# Patient Record
Sex: FEMALE | Race: OTHER | HISPANIC OR LATINO
[De-identification: names, ages, dates, MRNs, and addresses within clinical notes are randomized per-mention and may not be internally consistent; named-entity substitution may affect disease eponyms.]

---

## 2022-10-24 PROBLEM — Z00.00 ENCOUNTER FOR PREVENTIVE HEALTH EXAMINATION: Status: ACTIVE | Noted: 2022-10-24

## 2022-10-27 ENCOUNTER — APPOINTMENT (OUTPATIENT)
Dept: OBGYN | Facility: CLINIC | Age: 37
End: 2022-10-27

## 2022-11-02 ENCOUNTER — APPOINTMENT (OUTPATIENT)
Dept: OBGYN | Facility: CLINIC | Age: 37
End: 2022-11-02

## 2022-11-02 VITALS
DIASTOLIC BLOOD PRESSURE: 82 MMHG | BODY MASS INDEX: 31.76 KG/M2 | HEART RATE: 62 BPM | HEIGHT: 64 IN | SYSTOLIC BLOOD PRESSURE: 125 MMHG | WEIGHT: 186 LBS

## 2022-11-02 DIAGNOSIS — Z01.419 ENCOUNTER FOR GYNECOLOGICAL EXAMINATION (GENERAL) (ROUTINE) W/OUT ABNORMAL FINDINGS: ICD-10-CM

## 2022-11-02 PROCEDURE — 99385 PREV VISIT NEW AGE 18-39: CPT

## 2022-11-03 LAB
C TRACH RRNA SPEC QL NAA+PROBE: NOT DETECTED
HBV SURFACE AG SER QL: NONREACTIVE
HCV AB SER QL: NONREACTIVE
HCV S/CO RATIO: 0.13 S/CO
HIV1+2 AB SPEC QL IA.RAPID: NONREACTIVE
HPV HIGH+LOW RISK DNA PNL CVX: NOT DETECTED
N GONORRHOEA RRNA SPEC QL NAA+PROBE: NOT DETECTED
SOURCE AMPLIFICATION: NORMAL
T PALLIDUM AB SER QL IA: NEGATIVE

## 2022-11-07 LAB — CYTOLOGY CVX/VAG DOC THIN PREP: NORMAL

## 2022-11-16 ENCOUNTER — APPOINTMENT (OUTPATIENT)
Dept: OBGYN | Facility: CLINIC | Age: 37
End: 2022-11-16

## 2022-11-16 VITALS
DIASTOLIC BLOOD PRESSURE: 87 MMHG | BODY MASS INDEX: 31.58 KG/M2 | SYSTOLIC BLOOD PRESSURE: 119 MMHG | WEIGHT: 185 LBS | HEIGHT: 64 IN | HEART RATE: 69 BPM

## 2022-11-16 DIAGNOSIS — Z31.5 ENCOUNTER FOR PROCREATIVE GENETIC COUNSELING: ICD-10-CM

## 2022-11-16 PROCEDURE — 99213 OFFICE O/P EST LOW 20 MIN: CPT

## 2023-01-13 ENCOUNTER — APPOINTMENT (OUTPATIENT)
Dept: OBGYN | Facility: CLINIC | Age: 38
End: 2023-01-13

## 2023-07-05 ENCOUNTER — APPOINTMENT (OUTPATIENT)
Dept: OBGYN | Facility: CLINIC | Age: 38
End: 2023-07-05
Payer: COMMERCIAL

## 2023-07-05 ENCOUNTER — ASOB RESULT (OUTPATIENT)
Age: 38
End: 2023-07-05

## 2023-07-05 VITALS
HEIGHT: 64 IN | DIASTOLIC BLOOD PRESSURE: 73 MMHG | SYSTOLIC BLOOD PRESSURE: 108 MMHG | BODY MASS INDEX: 34.15 KG/M2 | WEIGHT: 200 LBS | HEART RATE: 65 BPM

## 2023-07-05 DIAGNOSIS — N92.6 IRREGULAR MENSTRUATION, UNSPECIFIED: ICD-10-CM

## 2023-07-05 DIAGNOSIS — Z78.9 OTHER SPECIFIED HEALTH STATUS: ICD-10-CM

## 2023-07-05 PROCEDURE — 99214 OFFICE O/P EST MOD 30 MIN: CPT

## 2023-07-05 PROCEDURE — 76817 TRANSVAGINAL US OBSTETRIC: CPT

## 2023-07-05 NOTE — PHYSICAL EXAM
[Appropriately responsive] : appropriately responsive [Chaperone Present] : A chaperone was present in the examining room during all aspects of the physical examination [Alert] : alert [No Acute Distress] : no acute distress [Regular Rate Rhythm] : regular rate rhythm [Clear to Auscultation B/L] : clear to auscultation bilaterally [Soft] : soft [Non-tender] : non-tender [Non-distended] : non-distended [Oriented x3] : oriented x3 [Labia Majora] : normal [Labia Minora] : normal [Normal] : normal

## 2023-07-05 NOTE — PLAN
[FreeTextEntry1] : 38 y/o  LMP 23 with pregnancy of unknown viability\par \par Plan:\par - TVUS confirmed, IUP but cannot confirm viability\par - pap up to date\par - urine culture, GC/CT, affirm collected\par - RTO in 2 weeks for repeat sono and f/u with me\par - f/u carrier screening results Continue to Observe

## 2023-07-05 NOTE — HISTORY OF PRESENT ILLNESS
[FreeTextEntry1] : 38 y/o  LMP 23 presents with missed menses and positive pregnancy test. TVUS shows intrauterine gestational sac with faint yolk sac but no fetal pole. Denies cramping, bleeding, does report vaginal odor. This is a desired pregnancy.

## 2023-07-06 ENCOUNTER — NON-APPOINTMENT (OUTPATIENT)
Age: 38
End: 2023-07-06

## 2023-07-06 LAB
C TRACH RRNA SPEC QL NAA+PROBE: NOT DETECTED
CANDIDA VAG CYTO: NOT DETECTED
G VAGINALIS+PREV SP MTYP VAG QL MICRO: NOT DETECTED
N GONORRHOEA RRNA SPEC QL NAA+PROBE: NOT DETECTED
SOURCE AMPLIFICATION: NORMAL
T VAGINALIS VAG QL WET PREP: NOT DETECTED

## 2023-07-07 LAB — BACTERIA UR CULT: NORMAL

## 2023-07-17 ENCOUNTER — APPOINTMENT (OUTPATIENT)
Dept: OBGYN | Facility: CLINIC | Age: 38
End: 2023-07-17
Payer: COMMERCIAL

## 2023-07-17 ENCOUNTER — ASOB RESULT (OUTPATIENT)
Age: 38
End: 2023-07-17

## 2023-07-17 VITALS
HEIGHT: 64 IN | DIASTOLIC BLOOD PRESSURE: 78 MMHG | HEART RATE: 73 BPM | WEIGHT: 200 LBS | SYSTOLIC BLOOD PRESSURE: 117 MMHG | BODY MASS INDEX: 34.15 KG/M2

## 2023-07-17 DIAGNOSIS — Z71.1 PERSON WITH FEARED HEALTH COMPLAINT IN WHOM NO DIAGNOSIS IS MADE: ICD-10-CM

## 2023-07-17 PROCEDURE — 76817 TRANSVAGINAL US OBSTETRIC: CPT

## 2023-07-17 PROCEDURE — 99213 OFFICE O/P EST LOW 20 MIN: CPT

## 2023-07-18 ENCOUNTER — NON-APPOINTMENT (OUTPATIENT)
Age: 38
End: 2023-07-18

## 2023-07-18 LAB
ABO + RH PNL BLD: NORMAL
BLD GP AB SCN SERPL QL: NORMAL

## 2023-07-19 ENCOUNTER — NON-APPOINTMENT (OUTPATIENT)
Age: 38
End: 2023-07-19

## 2023-07-27 ENCOUNTER — NON-APPOINTMENT (OUTPATIENT)
Age: 38
End: 2023-07-27

## 2023-07-27 ENCOUNTER — EMERGENCY (EMERGENCY)
Facility: HOSPITAL | Age: 38
LOS: 1 days | Discharge: HOME | End: 2023-07-27
Attending: EMERGENCY MEDICINE
Payer: COMMERCIAL

## 2023-07-27 VITALS
SYSTOLIC BLOOD PRESSURE: 110 MMHG | WEIGHT: 199.96 LBS | DIASTOLIC BLOOD PRESSURE: 60 MMHG | RESPIRATION RATE: 20 BRPM | HEART RATE: 67 BPM | TEMPERATURE: 98 F | OXYGEN SATURATION: 99 % | HEIGHT: 64 IN

## 2023-07-27 PROCEDURE — 99284 EMERGENCY DEPT VISIT MOD MDM: CPT

## 2023-07-27 NOTE — ED ADULT TRIAGE NOTE - CHIEF COMPLAINT QUOTE
Approximately 9 weeks pregnant, begun vaginally bleeding yesterday, endorsing dizziness/lightheadedness.

## 2023-07-27 NOTE — ED ADULT TRIAGE NOTE - SPO2 (%)
Quality 110: Preventive Care And Screening: Influenza Immunization: Influenza Immunization Ordered or Recommended, but not Administered due to system reason
Quality 431: Preventive Care And Screening: Unhealthy Alcohol Use - Screening: Patient not identified as an unhealthy alcohol user when screened for unhealthy alcohol use using a systematic screening method
Detail Level: Detailed
Quality 226: Preventive Care And Screening: Tobacco Use: Screening And Cessation Intervention: Patient screened for tobacco use and is an ex/non-smoker
99

## 2023-07-28 VITALS
RESPIRATION RATE: 16 BRPM | TEMPERATURE: 98 F | HEART RATE: 73 BPM | SYSTOLIC BLOOD PRESSURE: 103 MMHG | DIASTOLIC BLOOD PRESSURE: 67 MMHG | OXYGEN SATURATION: 98 %

## 2023-07-28 LAB
ALBUMIN SERPL ELPH-MCNC: 4.4 G/DL — SIGNIFICANT CHANGE UP (ref 3.3–5)
ALP SERPL-CCNC: 64 U/L — SIGNIFICANT CHANGE UP (ref 40–120)
ALT FLD-CCNC: 35 U/L — SIGNIFICANT CHANGE UP (ref 10–45)
ANION GAP SERPL CALC-SCNC: 12 MMOL/L — SIGNIFICANT CHANGE UP (ref 5–17)
APTT BLD: 36.4 SEC — HIGH (ref 24.5–35.6)
AST SERPL-CCNC: 28 U/L — SIGNIFICANT CHANGE UP (ref 10–40)
BASOPHILS # BLD AUTO: 0.03 K/UL — SIGNIFICANT CHANGE UP (ref 0–0.2)
BASOPHILS NFR BLD AUTO: 0.5 % — SIGNIFICANT CHANGE UP (ref 0–2)
BILIRUB SERPL-MCNC: 0.3 MG/DL — SIGNIFICANT CHANGE UP (ref 0.2–1.2)
BLD GP AB SCN SERPL QL: NEGATIVE — SIGNIFICANT CHANGE UP
BUN SERPL-MCNC: 14 MG/DL — SIGNIFICANT CHANGE UP (ref 7–23)
CALCIUM SERPL-MCNC: 9.4 MG/DL — SIGNIFICANT CHANGE UP (ref 8.4–10.5)
CHLORIDE SERPL-SCNC: 104 MMOL/L — SIGNIFICANT CHANGE UP (ref 96–108)
CO2 SERPL-SCNC: 22 MMOL/L — SIGNIFICANT CHANGE UP (ref 22–31)
CREAT SERPL-MCNC: 0.62 MG/DL — SIGNIFICANT CHANGE UP (ref 0.5–1.3)
EGFR: 117 ML/MIN/1.73M2 — SIGNIFICANT CHANGE UP
EOSINOPHIL # BLD AUTO: 0.26 K/UL — SIGNIFICANT CHANGE UP (ref 0–0.5)
EOSINOPHIL NFR BLD AUTO: 4.7 % — SIGNIFICANT CHANGE UP (ref 0–6)
GLUCOSE SERPL-MCNC: 93 MG/DL — SIGNIFICANT CHANGE UP (ref 70–99)
HCG SERPL-ACNC: 7335 MIU/ML — HIGH
HCT VFR BLD CALC: 38 % — SIGNIFICANT CHANGE UP (ref 34.5–45)
HGB BLD-MCNC: 12.6 G/DL — SIGNIFICANT CHANGE UP (ref 11.5–15.5)
IMM GRANULOCYTES NFR BLD AUTO: 0.4 % — SIGNIFICANT CHANGE UP (ref 0–0.9)
INR BLD: 1.07 RATIO — SIGNIFICANT CHANGE UP (ref 0.85–1.18)
LYMPHOCYTES # BLD AUTO: 2.22 K/UL — SIGNIFICANT CHANGE UP (ref 1–3.3)
LYMPHOCYTES # BLD AUTO: 39.9 % — SIGNIFICANT CHANGE UP (ref 13–44)
MCHC RBC-ENTMCNC: 28.8 PG — SIGNIFICANT CHANGE UP (ref 27–34)
MCHC RBC-ENTMCNC: 33.2 GM/DL — SIGNIFICANT CHANGE UP (ref 32–36)
MCV RBC AUTO: 87 FL — SIGNIFICANT CHANGE UP (ref 80–100)
MONOCYTES # BLD AUTO: 0.51 K/UL — SIGNIFICANT CHANGE UP (ref 0–0.9)
MONOCYTES NFR BLD AUTO: 9.2 % — SIGNIFICANT CHANGE UP (ref 2–14)
NEUTROPHILS # BLD AUTO: 2.52 K/UL — SIGNIFICANT CHANGE UP (ref 1.8–7.4)
NEUTROPHILS NFR BLD AUTO: 45.3 % — SIGNIFICANT CHANGE UP (ref 43–77)
NRBC # BLD: 0 /100 WBCS — SIGNIFICANT CHANGE UP (ref 0–0)
PLATELET # BLD AUTO: 282 K/UL — SIGNIFICANT CHANGE UP (ref 150–400)
POTASSIUM SERPL-MCNC: 3.8 MMOL/L — SIGNIFICANT CHANGE UP (ref 3.5–5.3)
POTASSIUM SERPL-SCNC: 3.8 MMOL/L — SIGNIFICANT CHANGE UP (ref 3.5–5.3)
PROT SERPL-MCNC: 7 G/DL — SIGNIFICANT CHANGE UP (ref 6–8.3)
PROTHROM AB SERPL-ACNC: 11.8 SEC — SIGNIFICANT CHANGE UP (ref 9.5–13)
RBC # BLD: 4.37 M/UL — SIGNIFICANT CHANGE UP (ref 3.8–5.2)
RBC # FLD: 11.5 % — SIGNIFICANT CHANGE UP (ref 10.3–14.5)
RH IG SCN BLD-IMP: POSITIVE — SIGNIFICANT CHANGE UP
SODIUM SERPL-SCNC: 138 MMOL/L — SIGNIFICANT CHANGE UP (ref 135–145)
WBC # BLD: 5.56 K/UL — SIGNIFICANT CHANGE UP (ref 3.8–10.5)
WBC # FLD AUTO: 5.56 K/UL — SIGNIFICANT CHANGE UP (ref 3.8–10.5)

## 2023-07-28 PROCEDURE — 84702 CHORIONIC GONADOTROPIN TEST: CPT

## 2023-07-28 PROCEDURE — 85025 COMPLETE CBC W/AUTO DIFF WBC: CPT

## 2023-07-28 PROCEDURE — 85610 PROTHROMBIN TIME: CPT

## 2023-07-28 PROCEDURE — 99284 EMERGENCY DEPT VISIT MOD MDM: CPT | Mod: 25

## 2023-07-28 PROCEDURE — 86850 RBC ANTIBODY SCREEN: CPT

## 2023-07-28 PROCEDURE — 86900 BLOOD TYPING SEROLOGIC ABO: CPT

## 2023-07-28 PROCEDURE — 80053 COMPREHEN METABOLIC PANEL: CPT

## 2023-07-28 PROCEDURE — 85730 THROMBOPLASTIN TIME PARTIAL: CPT

## 2023-07-28 PROCEDURE — 86901 BLOOD TYPING SEROLOGIC RH(D): CPT

## 2023-07-28 PROCEDURE — 76817 TRANSVAGINAL US OBSTETRIC: CPT | Mod: 26

## 2023-07-28 PROCEDURE — 76817 TRANSVAGINAL US OBSTETRIC: CPT

## 2023-07-28 RX ORDER — SODIUM CHLORIDE 9 MG/ML
1000 INJECTION INTRAMUSCULAR; INTRAVENOUS; SUBCUTANEOUS ONCE
Refills: 0 | Status: COMPLETED | OUTPATIENT
Start: 2023-07-28 | End: 2023-07-28

## 2023-07-28 RX ADMIN — SODIUM CHLORIDE 1000 MILLILITER(S): 9 INJECTION INTRAMUSCULAR; INTRAVENOUS; SUBCUTANEOUS at 00:18

## 2023-07-28 NOTE — ED PROVIDER NOTE - NSFOLLOWUPCLINICS_GEN_ALL_ED_FT
[facility]  Gynceology/OB  [address] [place] [address]  Phone: [phone]  Fax:      [facility]  Gynceology/OB  [address] [place] [address]  Phone: [phone]  Fax:      [facility]  Gynceology/OB  [address] [place] [address]  Phone: [phone]  Fax:

## 2023-07-28 NOTE — ED PROVIDER NOTE - OBJECTIVE STATEMENT
37-year-old female G3, P2 chief complaint vaginal bleeding.  Patient is 9 weeks pregnant.  Patient states that she went to the OB/GYN 2 weeks ago was not able to find the heart rate.  Patient states today and yesterday she started to experiencing vaginal bleeding 1 pad every 2 hours endorsing slight dizziness.

## 2023-07-28 NOTE — ED PROVIDER NOTE - PHYSICAL EXAMINATION
GENERAL: Awake, alert, NAD  LUNGS: CTAB, no wheezes or crackles   CARDIAC: RRR, no m/r/g  ABDOMEN: Soft, non tender, non distended, no rebound, no guarding  BACK: No midline spinal tenderness, no CVA tenderness  EXT: No edema, no calf tenderness, 2+ DP pulses bilaterally, no deformities.  NEURO: A&Ox3. Moving all extremities.  SKIN: Warm and dry. No rash.  PSYCH: Normal affect.

## 2023-07-28 NOTE — ED PROVIDER NOTE - PATIENT PORTAL LINK FT
You can access the FollowMyHealth Patient Portal offered by [facility] by registering at the following website: [email or web address] By joining [employer]’s FollowMyHealth portal, you will also be able to view your health information using other applications (apps) compatible with our system. You can access the FollowMyHealth Patient Portal offered by [facility] by registering at the following website: [email or web address] By joining [employer]’s FollowMyHealth portal, you will also be able to view your health information using other applications (apps) compatible with our system. You can access the FollowMyHealth Patient Portal offered by [facility] by registering at the following website: [email or web address]. By joining [email or web address]’s FollowMyHealth portal, you will also be able to view your health information using other applications (apps) compatible with our system.

## 2023-07-28 NOTE — ED ADULT NURSE NOTE - NSFALLUNIVINTERV_ED_ALL_ED
Bed/Stretcher in lowest position, wheels locked, appropriate side rails in place/Call bell, personal items and telephone in reach/Instruct patient to call for assistance before getting out of bed/chair/stretcher/Non-slip footwear applied when patient is off stretcher/[place] to call system/Physically safe environment - no spills, clutter or unnecessary equipment/Purposeful proactive rounding/Room/bathroom lighting operational, light cord in reach Bed/Stretcher in lowest position, wheels locked, appropriate side rails in place/Call bell, personal items and telephone in reach/Instruct patient to call for assistance before getting out of bed/chair/stretcher/Non-slip footwear applied when patient is off stretcher/[place] to call system/Physically safe environment - no spills, clutter or unnecessary equipment/Purposeful proactive rounding/Room/bathroom lighting operational, light cord in reach Bed/Stretcher in lowest position, wheels locked, appropriate side rails in place/Call bell, personal items and telephone in reach/Instruct patient to call for assistance before getting out of bed/chair/stretcher/Non-slip footwear applied when patient is off stretcher/[place] to call system/Physically safe environment - no spills, clutter or unnecessary equipment/Purposeful proactive rounding/Room/bathroom lighting operational, light cord in reach

## 2023-07-28 NOTE — ED PROVIDER NOTE - ATTENDING CONTRIBUTION TO CARE
36 yo female @~9 weeks with intermittent vaginal bleeding x 1 week p/w continued intermittent bleeding, otherwise no pelvic pain, conversant, no distress.  u/s here with sac, no HR detected, lower than expected HCG c/w fetal demise.  d/w patient, OK for d/c tonight, call OB in AM for further management.

## 2023-07-28 NOTE — ED ADULT NURSE NOTE - OBJECTIVE STATEMENT
36 YO female with no PMH    via walk in presenting with complaints of  vaginal bleeding. pt repots she is 9 weeks pregnant  went to OV appointment last week and was noted to have no fetal HR. pt reports vaginal bleeding starting to day soaking 2 pad. pt reports slight dizziness. denies CP, SOb, abdominal pain.   Pt Axox4, gross neuro intact, PERRL mm. respirations even, & non-labored Abdomen soft, non-tender, non-distended. Skin warm, dry, and intact. Pt placed in position of comfort. Pt educated on call bell system and provided call bell. Bed in lowest position, wheels locked, appropriate side rails raised. Pt denies needs at this time.

## 2023-07-28 NOTE — ED PROVIDER NOTE - NSFOLLOWUPINSTRUCTIONS_ED_ALL_ED_FT
Miscarriage  A miscarriage is the loss of pregnancy before the 20th week. Most miscarriages happen during the first 3 months of pregnancy. Sometimes, a miscarriage can happen before a woman knows that she is pregnant.    Having a miscarriage can be an emotional experience. If you have had a miscarriage, talk with your health care provider about any questions you may have about the loss of your baby, the grieving process, and your plans for future pregnancy.    What are the causes?  Many times, the cause of a miscarriage is not known.    What increases the risk?  The following factors may make a pregnant woman more likely to have a miscarriage:    Certain medical conditions    Conditions that affect the hormone balance in the body, such as thyroid disease or polycystic ovary syndrome.  Diabetes.  Autoimmune disorders.  Infections.  Bleeding disorders.  Obesity.  Lifestyle factors    Using products with tobacco or nicotine in them or being exposed to tobacco smoke.  Having alcohol.  Having large amounts of caffeine.  Recreational drug use.  Problems with reproductive organs or structures    Cervical insufficiency. This is when the lowest part of the uterus (cervix) opens and thins before pregnancy is at term.  Having a condition called Asherman syndrome. This syndrome causes scarring in the uterus or causes the uterus to be abnormal in structure.  Fibrous growths, called fibroids, in the uterus.  Congenital abnormalities. These problems are present at birth.  Infection of the cervix or uterus.  Personal or medical history    Injury (trauma).  Having had a miscarriage before.  Being younger than age 18 or older than age 35.  Exposure to harmful substances in the environment. This may include radiation or heavy metals, such as lead.  Use of certain medicines.  What are the signs or symptoms?  Symptoms of this condition include:  Vaginal bleeding or spotting, with or without cramps or pain.  Pain or cramping in the abdomen or lower back.  Fluid or tissue coming out of the vagina.  How is this diagnosed?  This condition may be diagnosed based on:  A physical exam.  Ultrasound.  Lab tests, such as blood tests, urine tests, or swabs for infection.  How is this treated?  Treatment for a miscarriage is sometimes not needed if all the pregnancy tissue that was in the uterus comes out on its own, and there are no other problems such as infection or heavy bleeding.    In other cases, this condition may be treated with:  Dilation and curettage (D&C). In this procedure, the cervix is stretched open and any remaining pregnancy tissue is removed from the lining of the uterus (endometrium).  Medicines. These may include:  Antibiotic medicine, to treat infection.  Medicine to help any remaining pregnancy tissue come out of the body.  Medicine to reduce (contract) the size of the uterus. These medicines may be given if there is a lot of bleeding.  If you have Rh-negative blood, you may be given an injection of a medicine called Rho(D) immune globulin. This medicine helps prevent problems with future pregnancies.    Follow these instructions at home:  Medicines    Take over-the-counter and prescription medicines only as told by your health care provider.  If you were prescribed antibiotic medicine, take it as told by your health care provider. Do not stop taking the antibiotic even if you start to feel better.  Activity    Rest as told by your health care provider. Ask your health care provider what activities are safe for you.  Have someone help with home and family responsibilities during this time.  General instructions      Monitor how much tissue or blood clot material comes out of the vagina.  Do not have sex, douche, or put anything, such as tampons, in your vagina until your health care provider says it is okay.  To help you and your partner with the grieving process, talk with your health care provider or get counseling.  When you are ready, meet with your health care provider to discuss any important steps you should take for your health. Also, discuss steps you should take to have a healthy pregnancy in the future.  Keep all follow-up visits. This is important.  Where to find more information  The American College of Obstetricians and Gynecologists: acog.org  [place] Department of Health and Human Services Office of Women's Health: hrsa.gov/office-womens-health  Contact a health care provider if:  You have a fever or chills.  There is bad-smelling fluid coming from the vagina.  You have more bleeding instead of less.  Tissue or blood clots come out of your vagina.  Get help right away if:  You have severe cramps or pain in your back or abdomen.  Heavy bleeding soaks through 2 large sanitary pads an hour for more than 2 hours.  You become light-headed or weak.  You faint.  You feel sad, and your sadness takes over your thoughts.  You think about hurting yourself.  If you ever feel like you may hurt yourself or others, or have thoughts about taking your own life, get help right away. Go to your nearest emergency department or:  Call your local emergency services (911 in the [place]  Call a suicide crisis helpline, such as the National Suicide Prevention Lifeline at 1-941.419.3145 or 740 in the [place] This is open 24 hours a day in the [place]  Text the Crisis Text Line at 488570 (in the [place]  Summary  Most miscarriages happen in the first 3 months of pregnancy. Sometimes miscarriage happens before a woman knows that she is pregnant.  Follow instructions from your health care provider about medicines and activity.  To help you and your partner with grieving, talk with your health care provider or get counseling.  Keep all follow-up visits.  This information is not intended to replace advice given to you by your health care provider. Make sure you discuss any questions you have with your health care provider. Miscarriage  A miscarriage is the loss of pregnancy before the 20th week. Most miscarriages happen during the first 3 months of pregnancy. Sometimes, a miscarriage can happen before a woman knows that she is pregnant.    Having a miscarriage can be an emotional experience. If you have had a miscarriage, talk with your health care provider about any questions you may have about the loss of your baby, the grieving process, and your plans for future pregnancy.    What are the causes?  Many times, the cause of a miscarriage is not known.    What increases the risk?  The following factors may make a pregnant woman more likely to have a miscarriage:    Certain medical conditions    Conditions that affect the hormone balance in the body, such as thyroid disease or polycystic ovary syndrome.  Diabetes.  Autoimmune disorders.  Infections.  Bleeding disorders.  Obesity.  Lifestyle factors    Using products with tobacco or nicotine in them or being exposed to tobacco smoke.  Having alcohol.  Having large amounts of caffeine.  Recreational drug use.  Problems with reproductive organs or structures    Cervical insufficiency. This is when the lowest part of the uterus (cervix) opens and thins before pregnancy is at term.  Having a condition called Asherman syndrome. This syndrome causes scarring in the uterus or causes the uterus to be abnormal in structure.  Fibrous growths, called fibroids, in the uterus.  Congenital abnormalities. These problems are present at birth.  Infection of the cervix or uterus.  Personal or medical history    Injury (trauma).  Having had a miscarriage before.  Being younger than age 18 or older than age 35.  Exposure to harmful substances in the environment. This may include radiation or heavy metals, such as lead.  Use of certain medicines.  What are the signs or symptoms?  Symptoms of this condition include:  Vaginal bleeding or spotting, with or without cramps or pain.  Pain or cramping in the abdomen or lower back.  Fluid or tissue coming out of the vagina.  How is this diagnosed?  This condition may be diagnosed based on:  A physical exam.  Ultrasound.  Lab tests, such as blood tests, urine tests, or swabs for infection.  How is this treated?  Treatment for a miscarriage is sometimes not needed if all the pregnancy tissue that was in the uterus comes out on its own, and there are no other problems such as infection or heavy bleeding.    In other cases, this condition may be treated with:  Dilation and curettage (D&C). In this procedure, the cervix is stretched open and any remaining pregnancy tissue is removed from the lining of the uterus (endometrium).  Medicines. These may include:  Antibiotic medicine, to treat infection.  Medicine to help any remaining pregnancy tissue come out of the body.  Medicine to reduce (contract) the size of the uterus. These medicines may be given if there is a lot of bleeding.  If you have Rh-negative blood, you may be given an injection of a medicine called Rho(D) immune globulin. This medicine helps prevent problems with future pregnancies.    Follow these instructions at home:  Medicines    Take over-the-counter and prescription medicines only as told by your health care provider.  If you were prescribed antibiotic medicine, take it as told by your health care provider. Do not stop taking the antibiotic even if you start to feel better.  Activity    Rest as told by your health care provider. Ask your health care provider what activities are safe for you.  Have someone help with home and family responsibilities during this time.  General instructions      Monitor how much tissue or blood clot material comes out of the vagina.  Do not have sex, douche, or put anything, such as tampons, in your vagina until your health care provider says it is okay.  To help you and your partner with the grieving process, talk with your health care provider or get counseling.  When you are ready, meet with your health care provider to discuss any important steps you should take for your health. Also, discuss steps you should take to have a healthy pregnancy in the future.  Keep all follow-up visits. This is important.  Where to find more information  The American College of Obstetricians and Gynecologists: acog.org  [place] Department of Health and Human Services Office of Women's Health: hrsa.gov/office-womens-health  Contact a health care provider if:  You have a fever or chills.  There is bad-smelling fluid coming from the vagina.  You have more bleeding instead of less.  Tissue or blood clots come out of your vagina.  Get help right away if:  You have severe cramps or pain in your back or abdomen.  Heavy bleeding soaks through 2 large sanitary pads an hour for more than 2 hours.  You become light-headed or weak.  You faint.  You feel sad, and your sadness takes over your thoughts.  You think about hurting yourself.  If you ever feel like you may hurt yourself or others, or have thoughts about taking your own life, get help right away. Go to your nearest emergency department or:  Call your local emergency services (911 in the [place]  Call a suicide crisis helpline, such as the National Suicide Prevention Lifeline at 1-835.737.5984 or 974 in the [place] This is open 24 hours a day in the [place]  Text the Crisis Text Line at 637357 (in the [place]  Summary  Most miscarriages happen in the first 3 months of pregnancy. Sometimes miscarriage happens before a woman knows that she is pregnant.  Follow instructions from your health care provider about medicines and activity.  To help you and your partner with grieving, talk with your health care provider or get counseling.  Keep all follow-up visits.  This information is not intended to replace advice given to you by your health care provider. Make sure you discuss any questions you have with your health care provider. Miscarriage  A miscarriage is the loss of pregnancy before the 20th week. Most miscarriages happen during the first 3 months of pregnancy. Sometimes, a miscarriage can happen before a woman knows that she is pregnant.    Having a miscarriage can be an emotional experience. If you have had a miscarriage, talk with your health care provider about any questions you may have about the loss of your baby, the grieving process, and your plans for future pregnancy.    What are the causes?  Many times, the cause of a miscarriage is not known.    What increases the risk?  The following factors may make a pregnant woman more likely to have a miscarriage:    Certain medical conditions    Conditions that affect the hormone balance in the body, such as thyroid disease or polycystic ovary syndrome.  Diabetes.  Autoimmune disorders.  Infections.  Bleeding disorders.  Obesity.  Lifestyle factors    Using products with tobacco or nicotine in them or being exposed to tobacco smoke.  Having alcohol.  Having large amounts of caffeine.  Recreational drug use.  Problems with reproductive organs or structures    Cervical insufficiency. This is when the lowest part of the uterus (cervix) opens and thins before pregnancy is at term.  Having a condition called Asherman syndrome. This syndrome causes scarring in the uterus or causes the uterus to be abnormal in structure.  Fibrous growths, called fibroids, in the uterus.  Congenital abnormalities. These problems are present at birth.  Infection of the cervix or uterus.  Personal or medical history    Injury (trauma).  Having had a miscarriage before.  Being younger than age 18 or older than age 35.  Exposure to harmful substances in the environment. This may include radiation or heavy metals, such as lead.  Use of certain medicines.  What are the signs or symptoms?  Symptoms of this condition include:  Vaginal bleeding or spotting, with or without cramps or pain.  Pain or cramping in the abdomen or lower back.  Fluid or tissue coming out of the vagina.  How is this diagnosed?  This condition may be diagnosed based on:  A physical exam.  Ultrasound.  Lab tests, such as blood tests, urine tests, or swabs for infection.  How is this treated?  Treatment for a miscarriage is sometimes not needed if all the pregnancy tissue that was in the uterus comes out on its own, and there are no other problems such as infection or heavy bleeding.    In other cases, this condition may be treated with:  Dilation and curettage (D&C). In this procedure, the cervix is stretched open and any remaining pregnancy tissue is removed from the lining of the uterus (endometrium).  Medicines. These may include:  Antibiotic medicine, to treat infection.  Medicine to help any remaining pregnancy tissue come out of the body.  Medicine to reduce (contract) the size of the uterus. These medicines may be given if there is a lot of bleeding.  If you have Rh-negative blood, you may be given an injection of a medicine called Rho(D) immune globulin. This medicine helps prevent problems with future pregnancies.    Follow these instructions at home:  Medicines    Take over-the-counter and prescription medicines only as told by your health care provider.  If you were prescribed antibiotic medicine, take it as told by your health care provider. Do not stop taking the antibiotic even if you start to feel better.  Activity    Rest as told by your health care provider. Ask your health care provider what activities are safe for you.  Have someone help with home and family responsibilities during this time.  General instructions      Monitor how much tissue or blood clot material comes out of the vagina.  Do not have sex, douche, or put anything, such as tampons, in your vagina until your health care provider says it is okay.  To help you and your partner with the grieving process, talk with your health care provider or get counseling.  When you are ready, meet with your health care provider to discuss any important steps you should take for your health. Also, discuss steps you should take to have a healthy pregnancy in the future.  Keep all follow-up visits. This is important.  Where to find more information  The American College of Obstetricians and Gynecologists: acog.org  [place] Department of Health and Human Services Office of Women's Health: hrsa.gov/office-womens-health  Contact a health care provider if:  You have a fever or chills.  There is bad-smelling fluid coming from the vagina.  You have more bleeding instead of less.  Tissue or blood clots come out of your vagina.  Get help right away if:  You have severe cramps or pain in your back or abdomen.  Heavy bleeding soaks through 2 large sanitary pads an hour for more than 2 hours.  You become light-headed or weak.  You faint.  You feel sad, and your sadness takes over your thoughts.  You think about hurting yourself.  If you ever feel like you may hurt yourself or others, or have thoughts about taking your own life, get help right away. Go to your nearest emergency department or:  Call your local emergency services (911 in the [place]  Call a suicide crisis helpline, such as the National Suicide Prevention Lifeline at 1-377.166.4492 or 028 in the [place] This is open 24 hours a day in the [place]  Text the Crisis Text Line at 272975 (in the [place]  Summary  Most miscarriages happen in the first 3 months of pregnancy. Sometimes miscarriage happens before a woman knows that she is pregnant.  Follow instructions from your health care provider about medicines and activity.  To help you and your partner with grieving, talk with your health care provider or get counseling.  Keep all follow-up visits.  This information is not intended to replace advice given to you by your health care provider. Make sure you discuss any questions you have with your health care provider.

## 2023-07-28 NOTE — ED PROVIDER NOTE - PROGRESS NOTE DETAILS
[facility] PGY 3 Discussed lab and radiology findings with patient. Patient feels comfortable going home. Gave home care and follow up instructions. Discussed which symptoms to look out for and when to return to the ED for further evaluation. Patient given opportunity to ask questions about their medical condition and had all questions answered.  rec fu w/ ob

## 2023-07-31 ENCOUNTER — EMERGENCY (EMERGENCY)
Facility: HOSPITAL | Age: 38
LOS: 1 days | Discharge: HOME | End: 2023-07-31
Attending: EMERGENCY MEDICINE
Payer: COMMERCIAL

## 2023-07-31 ENCOUNTER — APPOINTMENT (OUTPATIENT)
Dept: OBGYN | Facility: CLINIC | Age: 38
End: 2023-07-31

## 2023-07-31 ENCOUNTER — NON-APPOINTMENT (OUTPATIENT)
Age: 38
End: 2023-07-31

## 2023-07-31 VITALS
WEIGHT: 199.96 LBS | DIASTOLIC BLOOD PRESSURE: 69 MMHG | RESPIRATION RATE: 20 BRPM | OXYGEN SATURATION: 100 % | HEART RATE: 71 BPM | SYSTOLIC BLOOD PRESSURE: 133 MMHG | HEIGHT: 64 IN | TEMPERATURE: 99 F

## 2023-07-31 LAB
ALBUMIN SERPL ELPH-MCNC: 4.9 G/DL — SIGNIFICANT CHANGE UP (ref 3.3–5)
ALP SERPL-CCNC: 72 U/L — SIGNIFICANT CHANGE UP (ref 40–120)
ALT FLD-CCNC: 30 U/L — SIGNIFICANT CHANGE UP (ref 10–45)
ANION GAP SERPL CALC-SCNC: 15 MMOL/L — SIGNIFICANT CHANGE UP (ref 5–17)
AST SERPL-CCNC: 21 U/L — SIGNIFICANT CHANGE UP (ref 10–40)
BASOPHILS # BLD AUTO: 0.03 K/UL — SIGNIFICANT CHANGE UP (ref 0–0.2)
BASOPHILS NFR BLD AUTO: 0.4 % — SIGNIFICANT CHANGE UP (ref 0–2)
BILIRUB SERPL-MCNC: 0.6 MG/DL — SIGNIFICANT CHANGE UP (ref 0.2–1.2)
BUN SERPL-MCNC: 9 MG/DL — SIGNIFICANT CHANGE UP (ref 7–23)
CALCIUM SERPL-MCNC: 9.7 MG/DL — SIGNIFICANT CHANGE UP (ref 8.4–10.5)
CHLORIDE SERPL-SCNC: 103 MMOL/L — SIGNIFICANT CHANGE UP (ref 96–108)
CO2 SERPL-SCNC: 22 MMOL/L — SIGNIFICANT CHANGE UP (ref 22–31)
CREAT SERPL-MCNC: 0.58 MG/DL — SIGNIFICANT CHANGE UP (ref 0.5–1.3)
EGFR: 119 ML/MIN/1.73M2 — SIGNIFICANT CHANGE UP
EOSINOPHIL # BLD AUTO: 0.1 K/UL — SIGNIFICANT CHANGE UP (ref 0–0.5)
EOSINOPHIL NFR BLD AUTO: 1.3 % — SIGNIFICANT CHANGE UP (ref 0–6)
GLUCOSE SERPL-MCNC: 86 MG/DL — SIGNIFICANT CHANGE UP (ref 70–99)
HCG SERPL-ACNC: 4824 MIU/ML — HIGH
HCT VFR BLD CALC: 41.7 % — SIGNIFICANT CHANGE UP (ref 34.5–45)
HGB BLD-MCNC: 13.8 G/DL — SIGNIFICANT CHANGE UP (ref 11.5–15.5)
IMM GRANULOCYTES NFR BLD AUTO: 0.3 % — SIGNIFICANT CHANGE UP (ref 0–0.9)
LYMPHOCYTES # BLD AUTO: 1.55 K/UL — SIGNIFICANT CHANGE UP (ref 1–3.3)
LYMPHOCYTES # BLD AUTO: 19.6 % — SIGNIFICANT CHANGE UP (ref 13–44)
MCHC RBC-ENTMCNC: 28.8 PG — SIGNIFICANT CHANGE UP (ref 27–34)
MCHC RBC-ENTMCNC: 33.1 GM/DL — SIGNIFICANT CHANGE UP (ref 32–36)
MCV RBC AUTO: 86.9 FL — SIGNIFICANT CHANGE UP (ref 80–100)
MONOCYTES # BLD AUTO: 0.55 K/UL — SIGNIFICANT CHANGE UP (ref 0–0.9)
MONOCYTES NFR BLD AUTO: 7 % — SIGNIFICANT CHANGE UP (ref 2–14)
NEUTROPHILS # BLD AUTO: 5.66 K/UL — SIGNIFICANT CHANGE UP (ref 1.8–7.4)
NEUTROPHILS NFR BLD AUTO: 71.4 % — SIGNIFICANT CHANGE UP (ref 43–77)
NRBC # BLD: 0 /100 WBCS — SIGNIFICANT CHANGE UP (ref 0–0)
PLATELET # BLD AUTO: 324 K/UL — SIGNIFICANT CHANGE UP (ref 150–400)
POTASSIUM SERPL-MCNC: 3.5 MMOL/L — SIGNIFICANT CHANGE UP (ref 3.5–5.3)
POTASSIUM SERPL-SCNC: 3.5 MMOL/L — SIGNIFICANT CHANGE UP (ref 3.5–5.3)
PROT SERPL-MCNC: 7.8 G/DL — SIGNIFICANT CHANGE UP (ref 6–8.3)
RBC # BLD: 4.8 M/UL — SIGNIFICANT CHANGE UP (ref 3.8–5.2)
RBC # FLD: 11.7 % — SIGNIFICANT CHANGE UP (ref 10.3–14.5)
SODIUM SERPL-SCNC: 140 MMOL/L — SIGNIFICANT CHANGE UP (ref 135–145)
WBC # BLD: 7.91 K/UL — SIGNIFICANT CHANGE UP (ref 3.8–10.5)
WBC # FLD AUTO: 7.91 K/UL — SIGNIFICANT CHANGE UP (ref 3.8–10.5)

## 2023-07-31 PROCEDURE — 99285 EMERGENCY DEPT VISIT HI MDM: CPT

## 2023-07-31 PROCEDURE — 93975 VASCULAR STUDY: CPT | Mod: 26

## 2023-07-31 PROCEDURE — 99213 OFFICE O/P EST LOW 20 MIN: CPT

## 2023-07-31 PROCEDURE — 76830 TRANSVAGINAL US NON-OB: CPT | Mod: 26

## 2023-07-31 RX ORDER — ACETAMINOPHEN 500 MG
2 TABLET ORAL
Qty: 40 | Refills: 0
Start: 2023-07-31

## 2023-07-31 RX ORDER — ONDANSETRON 8 MG/1
1 TABLET, FILM COATED ORAL
Qty: 6 | Refills: 0
Start: 2023-07-31

## 2023-07-31 RX ORDER — OXYCODONE HYDROCHLORIDE 5 MG/1
1 TABLET ORAL
Qty: 12 | Refills: 0
Start: 2023-07-31

## 2023-07-31 RX ORDER — MISOPROSTOL 200 UG/1
4 TABLET ORAL
Qty: 4 | Refills: 0
Start: 2023-07-31 | End: 2023-07-31

## 2023-07-31 RX ORDER — MIFEPRISTONE 300 MG/1
200 TABLET, FILM COATED ORAL ONCE
Refills: 0 | Status: COMPLETED | OUTPATIENT
Start: 2023-07-31 | End: 2023-07-31

## 2023-07-31 RX ORDER — MORPHINE SULFATE 50 MG/1
4 CAPSULE, EXTENDED RELEASE ORAL ONCE
Refills: 0 | Status: DISCONTINUED | OUTPATIENT
Start: 2023-07-31 | End: 2023-07-31

## 2023-07-31 RX ADMIN — MORPHINE SULFATE 4 MILLIGRAM(S): 50 CAPSULE, EXTENDED RELEASE ORAL at 14:14

## 2023-07-31 NOTE — ED PROVIDER NOTE - CLINICAL SUMMARY MEDICAL DECISION MAKING FREE TEXT BOX
37-year-old female  EGA 10 weeks with recent transvaginal ultrasound on  showing likely failed pregnancy presents to the emergency room with vaginal bleeding since last night.  Blood is bright red with clots.  And is associated with uterine cramping which feels like "contractions."No lightheadedness, dizziness, syncope, chest pain, shortness of breath, dyspnea on exertion.  Taking Tylenol for pain with minimal relief.  Patient is hemodynamically stable.  Cervix is dilated with dark red clot expelling.  Scant bright red blood in the vaginal vault.  No hemorrhage.  Will send CBC to evaluate for change.  Repeat beta.  Type is on file.  Hold off on transvaginal ultrasound at this time.  Consult to OB.  Dispo pending OB Recs. 37-year-old female  EGA 10 weeks with recent transvaginal ultrasound on  showing likely failed pregnancy presents to the emergency room with vaginal bleeding since last night.  Blood is bright red with clots.  And is associated with uterine cramping which feels like "contractions." No lightheadedness, dizziness, syncope, chest pain, shortness of breath, dyspnea on exertion.  Taking Tylenol for pain with minimal relief.  Patient is hemodynamically stable.  Cervix is dilated with dark red clot expelling.  Scant bright red blood in the vaginal vault.  No hemorrhage.  Will send CBC to evaluate for change.  Repeat beta.  Type is on file.  Hold off on transvaginal ultrasound at this time.  Consult to OB.  Dispo pending OB Recs.

## 2023-07-31 NOTE — CONSULT NOTE ADULT - ATTENDING COMMENTS
OBGYN  attending Note: Agree with above, patient seen by me  38 y/o P2 LMP 5/11/23 at 11wks by dates. Seen in ER a few days ago and diagnosed with a missed ab. patient present with bleeding since this morning and cramps. Patient did not take cytotec.   PE: Pt in NAD  Abdomen: soft, NT,ND   Pelvic: Cx Ft/long, + blood in vault , no active bleeding noted   Hct 41.7, HCG 4824.  T/S Rh+   A/P 38 y/o P2 LMP 5/11/23 at 11 wks diagnosed with a missed ab. presents with bleeding. Possible missed ab, incomplete ab vs complete ab.  IVF, labs  Pelvic us  Consider po cytotec if pt stable and missed ab.   Continue to monitor, management pending pelvic us results  Daphne Aguirre MD OBGYN  attending Note: Agree with above, patient seen by me  36 y/o P2 LMP 5/11/23 at 11wks by dates. Seen in ER a few days ago and diagnosed with a missed ab. patient present with bleeding since this morning and cramps. Patient did not take cytotec.   PE: Pt in NAD  Abdomen: soft, NT,ND   Pelvic: Cx Ft/long, + blood in vault , no active bleeding noted   Hct 41.7, HCG 4824.  T/S Rh+   A/P 36 y/o P2 LMP 5/11/23 at 11 wks diagnosed with a missed ab. presents with bleeding. Possible missed ab, incomplete ab vs complete ab.  IVF, labs  Pelvic us  Consider po cytotec if pt stable and missed ab.   Continue to monitor, management pending pelvic us results  Daphne Aguirre MD OBGYN  attending Note: Agree with above, patient seen by me  38 y/o P2 LMP 5/11/23 at 11wks by dates. Seen in ER a few days ago and diagnosed with a missed ab. patient present with bleeding since this morning and cramps. Patient did not take cytotec.   PE: Pt in NAD  Abdomen: soft, NT,ND   Pelvic: Cx Ft/long, + blood in vault , no active bleeding noted   Hct 41.7, HCG 4824.  T/S Rh+   A/P 38 y/o P2 LMP 5/11/23 at 11 wks diagnosed with a missed ab. presents with bleeding. Possible missed ab, incomplete ab vs complete ab.  IVF, labs  Pelvic us  Consider po cytotec if pt stable and missed ab.   Continue to monitor, management pending pelvic us results  Daphne Aguirre MD         Update.    Patient currently clinically improved with mild cramping. Will receive Mifepristone 200mg now and 800mcg buccal cytotec at home (Rx to pharmacy)  - Rh+, no need for Rhogam  - Bleeding and return precautions provided.  - will f/u with  at end of week.    Pablito Lizarraga MD  . OBGYN  attending Note: Agree with above, patient seen by me  36 y/o P2 LMP 5/11/23 at 11wks by dates. Seen in ER a few days ago and diagnosed with a missed ab. patient present with bleeding since this morning and cramps. Patient did not take cytotec.   PE: Pt in NAD  Abdomen: soft, NT,ND   Pelvic: Cx Ft/long, + blood in vault , no active bleeding noted   Hct 41.7, HCG 4824.  T/S Rh+   A/P 36 y/o P2 LMP 5/11/23 at 11 wks diagnosed with a missed ab. presents with bleeding. Possible missed ab, incomplete ab vs complete ab.  IVF, labs  Pelvic us  Consider po cytotec if pt stable and missed ab.   Continue to monitor, management pending pelvic us results  Daphne Aguirre MD         Update.    Patient currently clinically improved with mild cramping. Will receive Mifepristone 200mg now and 800mcg buccal cytotec at home (Rx to pharmacy)  - Rh+, no need for Rhogam  - Bleeding and return precautions provided.  - will f/u with  at end of week.    Pablito Lizarraga MD  .

## 2023-07-31 NOTE — ED PROVIDER NOTE - PATIENT PORTAL LINK FT
You can access the FollowMyHealth Patient Portal offered by [facility] by registering at the following website: [email or web address] By joining [employer]’s FollowMyHealth portal, you will also be able to view your health information using other applications (apps) compatible with our system. You can access the FollowMyHealth Patient Portal offered by [facility] by registering at the following website: [email or web address]. By joining [employer]’s FollowMyHealth portal, you will also be able to view your health information using other applications (apps) compatible with our system. You can access the FollowMyHealth Patient Portal offered by [facility] by registering at the following website: [email or web address]. By joining [employer]’s FollowMyHealth portal, you will also be able to view your health information using other applications (apps) compatible with our system.

## 2023-07-31 NOTE — ED PROVIDER NOTE - OBJECTIVE STATEMENT
36 y/o F  presents with 7 hours of vaginal bleeding. Pt is 10 weeks pregnant. She had TVUS  showing gestational sac with yolk sac but no fetal pole. Was seen at [facility] ED on  repeat TVUS showed no growth, no yolk sac or pole. She was informed that she is likely having a misscariage. She was given f/u with Dr. Barron. This morning she bled through 3 pads, complains of cramping pelvic pain and chills. She was supposed to have inpatient appointment with Dr. Barron but returned to the ED due to pain. Denies lightheadness, dizziness, fever, body aches, dysuria, hematuria, abdominal pain. 36 y/o F  presents with 7 hours of vaginal bleeding. Pt is 10 weeks pregnant. She had TVUS  showing gestational sac with yolk sac but no fetal pole. Was seen at [facility] ED on  repeat TVUS showed no growth, no yolk sac or pole. She was informed that she is likely having a misscariage. She was given f/u with Dr. Barron. This morning she bled through 3 pads, complains of cramping pelvic pain and chills. She was supposed to have inpatient appointment with Dr. Barron but returned to the ED due to pain. Denies lightheadness, dizziness, fever, body aches, dysuria, hematuria, abdominal pain. 38 y/o F  presents with 7 hours of vaginal bleeding. Pt is 10 weeks pregnant. She had TVUS  showing gestational sac with yolk sac but no fetal pole. Was seen at [facility] ED on  repeat TVUS showed no growth, no yolk sac or pole. She was informed that she is likely having a misscariage. She was given f/u with Dr. Barron. This morning she bled through 3 pads, complains of cramping pelvic pain and chills. She was supposed to have inpatient appointment with Dr. Barron but returned to the ED due to pain. Denies lightheadness, dizziness, fever, body aches, dysuria, hematuria, abdominal pain.

## 2023-07-31 NOTE — ED PROVIDER NOTE - PHYSICAL EXAMINATION
Physical Exam:  Gen: NAD, AOx3, appears to be uncomfortable, able to ambulate without assistance  Head: NCAT  HEENT: EOMI, PEERLA, normal conjunctiva, tongue midline, oral mucosa moist  Lung: CTAB, no respiratory distress, no wheezes/rhonchi/rales B/L, speaking in full sentences  CV: RRR, no murmurs, rubs or gallops  Abd: soft, NT, ND, no guarding, no rigidity, no rebound tenderness, no CVA tenderness   : open cervical os, pooling of blood right side of vaginal vault, no clots  MSK: no visible deformities, ROM normal in UE/LE, no back pain  Neuro: No focal sensory or motor deficits  Skin: Warm, well perfused, no rash, no leg swelling  Psych: normal affect, calm

## 2023-07-31 NOTE — ED PROVIDER NOTE - NSFOLLOWUPINSTRUCTIONS_ED_ALL_ED_FT
YOU WERE SEEN IN THE ED FOR: Vaginal bleeding     WHILE YOU WERE HERE, YOU HAD: Medications + U/S and seen by OBGYN      FOR PAIN, YOU MAY TAKE TYLENOL (Acetaminophen) AND/OR IBUPROFEN (Advil or Motrin). FOLLOW THE INSTRUCTIONS ON THE LABEL/CONTAINER. OR FOR SEVERE PAIN PLEASE TAKE OXYCODONE    PLEASE FOLLOW UP WITH YOUR PRIVATE PHYSICIAN WITHIN THE NEXT 72 HOURS. BRING COPIES OF YOUR RESULTS. PLEASE FOLLOW UP W/  IN 1 WEEK.     RETURN TO THE EMERGENCY DEPARTMENT IF YOU EXPERIENCE ANY NEW/CONCERNING/WORSENING SYMPTOMS SUCH AS BUT NOT LIMITED TO: Worsening bleeding, headache, shortness of breath, chest pain, fever, passing out.

## 2023-07-31 NOTE — ED ADULT NURSE NOTE - CAS ELECT INFOMATION PROVIDED
[FreeTextEntry1] : referral provided to pulmonary since pt. 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 D3 supplement.\par  labs reviewed. AVOID HEPATOTOXINS.\par  low fat diet avoid conc. sweets.\par  weight loss. repeat labs in 3 months.  DC instructions

## 2023-07-31 NOTE — ED PROVIDER NOTE - PRIOR OUTPATIENT RADIOLOGY SUMMARY
tvus from early july done at [facility] tvus from early july done at [facility] tvus from early july done at [facility] 36 y/o F  presents with 7 hours of vaginal bleeding. Pt is 10 weeks pregnant. She had TVUS  showing gestational sac with yolk sac but no fetal pole. Was seen at [facility] ED on  repeat TVUS showed no growth, no yolk sac or pole. She was informed that she is likely having a miscarriage, OB wanted a repeat US in 7-10 days for further confirmation. She was given f/u with Dr. Barron. This morning she bled through 3 pads, complains of cramping pelvic pain and chills. She was supposed to have inpatient appointment with Dr. Barrno but returned to the ED due to pain. Denies lightheadedness, dizziness, fever, body aches, dysuria, hematuria, abdominal pain. Patient is hemodynamically stable, not actively hemmorhaging. On pelvic exam the cervical os is open, pooling of blood in the vaginal vault. Will give morphine for pain, repeat HcG and basic labs. Consult OBGYN and wait for recs. Likely discharge patient with follow up. 38 y/o F  presents with 7 hours of vaginal bleeding. Pt is 10 weeks pregnant. She had TVUS  showing gestational sac with yolk sac but no fetal pole. Was seen at [facility] ED on  repeat TVUS showed no growth, no yolk sac or pole. She was informed that she is likely having a miscarriage, OB wanted a repeat US in 7-10 days for further confirmation. She was given f/u with Dr. Barron. This morning she bled through 3 pads, complains of cramping pelvic pain and chills. She was supposed to have inpatient appointment with Dr. Barron but returned to the ED due to pain. Denies lightheadedness, dizziness, fever, body aches, dysuria, hematuria, abdominal pain. Patient is hemodynamically stable, not actively hemmorhaging. On pelvic exam the cervical os is open, pooling of blood in the vaginal vault. Will give morphine for pain, repeat HcG and basic labs. Consult OBGYN and wait for recs. Likely discharge patient with follow up. 38 y/o F  presents with 7 hours of vaginal bleeding. Pt is 10 weeks pregnant. She had TVUS  showing gestational sac with yolk sac but no fetal pole. Was seen at [facility] ED on  repeat TVUS showed no growth, no yolk sac or pole. She was informed that she is likely having a miscarriage, OB wanted a repeat US in 7-10 days for further confirmation. She was given f/u with Dr. Barron. This morning she bled through 3 pads, complains of cramping pelvic pain and chills. She was supposed to have inpatient appointment with Dr. Barron but returned to the ED due to pain. Denies lightheadedness, dizziness, fever, body aches, dysuria, hematuria, abdominal pain. Patient is hemodynamically stable, not actively hemmorhaging. On pelvic exam the cervical os is open, pooling of blood in the vaginal vault. Will give morphine for pain, repeat HcG and basic labs. Consult OBGYN and wait for recs. Likely discharge patient with follow up. TVUS from [facility] TVUS from [facility] TVUS from [facility]

## 2023-07-31 NOTE — CONSULT NOTE ADULT - ASSESSMENT
37y  at 11w4d by LMP  with known ongoing likely MAB presents with heavy vaginal bleeding and abdominal cramping since this AM. Patient hemodynamically and clinically stable.  H/H: 13.8/41.7.  Abdominal exam notable for mild tenderness in lower abdomen, but is distractible.  Pelvic exam notable for blood in vaginal vault, but no ongoing active vaginal bleeding, minimal bleeding on pad.  HCG downtrending appropriately consistent with ongoing miscarriage.    Recommendations:  - Final recommendations awaiting TVUS.     d/w Dr. Timothy Slaughter PGY2 37y  at 11w4d by LMP  with known ongoing likely MAB presents with heavy vaginal bleeding and abdominal cramping since this AM. Patient hemodynamically and clinically stable.  H/H: 13.8/41.7.  Abdominal exam notable for mild tenderness in lower abdomen, but is distractible.  Pelvic exam notable for blood in vaginal vault, but no ongoing active vaginal bleeding, minimal bleeding on pad. TVUS showed gestational sac with possible yolk sac lower in the uterine cavity as compared to prior ultrasound. HCG downtrended by 34%, consistent with ongoing miscarriage. Diagnosis of incomplete   - Patient counseled that options include expectant management medical management, or outpatient surgical management. Patient prefers medical management.  - Patient to receive mifepristone 200mg in ED  - Patient will take misoprostol 800mcg buccally at home   - Blood type: O+. No Rhogam recommended at this time.  - Patient counseled that she will experience increased bleeding and cramping. Patient to return to ED if she has lightheadedness, nausea, vomiting, saturating >2pads completely in one hour or less for more than 2 hours  - Patient cleared for discharge from GYN perspective  - Primary management per ED      d/w Dr. Aguirre and Dr. Elham Slaughter PGY2  CARLENE Warren, PGY2

## 2023-07-31 NOTE — CONSULT NOTE ADULT - SUBJECTIVE AND OBJECTIVE BOX
GYN Consult Note    37y  at 11w4d by LMP  with known ongoing likely MAB presents with heavy vaginal bleeding and abdominal cramping since this AM.  Patient was previously seen in ED on  for vaginal bleeding (1 pad every 2 hours) and was discharged home at that time.  Reports that this AM, she began experiencing heavy vaginal bleeding (soaking 3 pads/hour) with passage of small clots from 8am-12pm when she came to ED.  Also endorses "contraction-like" cramps that are so painful they brought her to tears at home.  Stated she was unable to walk due to pain.  States she took [id number] Tylenol at home with no relief of the pain.  Endorses headache and mild dizziness, and some chills, but otherwise denies chest pain, shortness of breath, fevers.      Of note, on chart review, patient has been followed with private gyn for ongoing early pregnancy loss:  TVUS (): gestational sac + faint yolk sac, no fetal pole  TVUS (): gestational sac + faint yolk sac, no fetal pole  TVUS (): gestational sac, no yolk sac or fetal pole.  HC    OB/GYN HISTORY:   Physician: Dr. Barron  Ob: c/s x2 2/2 to hx of myomectomy (, )  Gyn: Fibroids s/p LSC myomectomy x2 (age 19 and 22); denies ovarian cysts, abnormal pap smears, STIs   PMH: Denies    PSH:   - LSC myomectomy x2 (age 19 and 22)  - C/S x2 (, )  - Gastric bypass () with second surgery for "skin resection" after bypass - states not abdominoplasty  - Open hernia repair ()  - Ventral hernia repair with mesh (2017)  Meds: PNV, Tylenol  Allergies: NKDA        Vital Signs Last 24 Hrs  T(C): 37.1 (2023 12:51), Max: 37.1 (2023 12:51)  T(F): 98.7 (2023 12:51), Max: 98.7 (2023 12:51)  HR: 71 (2023 14:08) (65 - 71)  BP: 127/66 (2023 14:08) (124/80 - 133/69)  BP(mean): 95 (2023 13:14) (95 - 95)  RR: 18 (2023 14:08) (18 - 20)  SpO2: 97% (2023 14:08) (97% - 100%)    Parameters below as of 2023 14:08  Patient On (Oxygen Delivery Method): room air      PHYSICAL EXAM:   Gen: NAD, alert and oriented x 3  Cardiovascular: RRR  Respiratory: breathing comfortably on RA  Abd: soft, mildly tender to palpation in lower abdomen - however easily distractible no rebound, no guarding, non-distended, well healed midline vertical skin incision and horizontal lower abdominal incision  Pelvic: 0.5cm dilated no CMT, Uterus: approx 8 wk size, mildly tender to palpation, minimal bleeding on pad  Adnexa: non tender, no palpable masses  Speculum Exam: Approx 15cc blood in vaginal vault, no active bleeding from os.   Extremities: non-tender b/l, no edema   Skin: warm and well perfused  *Pelvic exam performed with chaperone present: ED tech: Johana    LABS:                        13.8   7.91  )-----------( 324      ( 2023 13:47 )             41.7     07-31    140  |  103  |  9   ----------------------------<  86  3.5   |  22  |  0.58    Ca    9.7      2023 13:47    TPro  7.8  /  Alb  4.9  /  TBili  0.6  /  DBili  x   /  AST  21  /  ALT  30  /  AlkPhos  72  07-    HC      Urinalysis Basic - ( 2023 13:47 )    Color: x / Appearance: x / SG: x / pH: x  Gluc: 86 mg/dL / Ketone: x  / Bili: x / Urobili: x   Blood: x / Protein: x / Nitrite: x   Leuk Esterase: x / RBC: x / WBC x   Sq Epi: x / Non Sq Epi: x / Bacteria: x        RADIOLOGY & ADDITIONAL STUDIES: GYN Consult Note    37y  at 11w4d by LMP  with known ongoing likely MAB presents with heavy vaginal bleeding and abdominal cramping since this AM.  Patient was previously seen in ED on  for vaginal bleeding (1 pad every 2 hours) and was discharged home at that time.  Reports that this AM, she began experiencing heavy vaginal bleeding (soaking 3 pads/hour) with passage of small clots from 8am-12pm when she came to ED.  Also endorses "contraction-like" cramps that are so painful they brought her to tears at home.  Stated she was unable to walk due to pain.  States she took [id number] Tylenol at home with no relief of the pain.  Endorses headache and mild dizziness, and some chills, but otherwise denies chest pain, shortness of breath, fevers.      Of note, on chart review, patient has been followed with private gyn for ongoing early pregnancy loss:  TVUS (): gestational sac + faint yolk sac, no fetal pole  TVUS (): gestational sac + faint yolk sac, no fetal pole  TVUS (): gestational sac, no yolk sac or fetal pole.  HC    OB/GYN HISTORY:   Physician: Dr. Barron  Ob: c/s x2 2/2 to hx of myomectomy (, )  Gyn: Fibroids s/p LSC myomectomy x2 (age 19 and 22); denies ovarian cysts, abnormal pap smears, STIs   PMH: Denies    PSH:   - LSC myomectomy x2 (age 19 and 22)  - C/S x2 (, )  - Gastric bypass () with second surgery for "skin resection" after bypass - states not abdominoplasty  - Open hernia repair ()  - Ventral hernia repair with mesh (2017)  Meds: PNV, Tylenol  Allergies: NKDA        Vital Signs Last 24 Hrs  T(C): 37.1 (2023 12:51), Max: 37.1 (2023 12:51)  T(F): 98.7 (2023 12:51), Max: 98.7 (2023 12:51)  HR: 71 (2023 14:08) (65 - 71)  BP: 127/66 (2023 14:08) (124/80 - 133/69)  BP(mean): 95 (2023 13:14) (95 - 95)  RR: 18 (2023 14:08) (18 - 20)  SpO2: 97% (2023 14:08) (97% - 100%)    Parameters below as of 2023 14:08  Patient On (Oxygen Delivery Method): room air      PHYSICAL EXAM:   Gen: NAD, alert and oriented x 3  Cardiovascular: RRR  Respiratory: breathing comfortably on RA  Abd: soft, mildly tender to palpation in lower abdomen - however easily distractible no rebound, no guarding, non-distended, well healed midline vertical skin incision and horizontal lower abdominal incision  Pelvic: 0.5cm dilated no CMT, Uterus: approx 8 wk size, mildly tender to palpation, minimal bleeding on pad  Adnexa: non tender, no palpable masses  Speculum Exam: Approx 15cc blood in vaginal vault, no active bleeding from os.   Extremities: non-tender b/l, no edema   Skin: warm and well perfused  *Pelvic exam performed with chaperone present: ED tech: Johana    LABS:                        13.8   7.91  )-----------( 324      ( 2023 13:47 )             41.7     07-31    140  |  103  |  9   ----------------------------<  86  3.5   |  22  |  0.58    Ca    9.7      2023 13:47    TPro  7.8  /  Alb  4.9  /  TBili  0.6  /  DBili  x   /  AST  21  /  ALT  30  /  AlkPhos  72  07-    HC      Urinalysis Basic - ( 2023 13:47 )    Color: x / Appearance: x / SG: x / pH: x  Gluc: 86 mg/dL / Ketone: x  / Bili: x / Urobili: x   Blood: x / Protein: x / Nitrite: x   Leuk Esterase: x / RBC: x / WBC x   Sq Epi: x / Non Sq Epi: x / Bacteria: x        RADIOLOGY & ADDITIONAL STUDIES: GYN Consult Note    37y  at 11w4d by LMP  with known ongoing likely MAB presents with heavy vaginal bleeding and abdominal cramping since this AM.  Patient was previously seen in ED on  for vaginal bleeding (1 pad every 2 hours) and was discharged home at that time.  Reports that this AM, she began experiencing heavy vaginal bleeding (soaking 3 pads/hour) with passage of small clots from 8am-12pm when she came to ED.  Also endorses "contraction-like" cramps that are so painful they brought her to tears at home.  Stated she was unable to walk due to pain.  States she took [id number] Tylenol at home with no relief of the pain.  Endorses headache and mild dizziness, and some chills, but otherwise denies chest pain, shortness of breath, fevers.      Of note, on chart review, patient has been followed with private gyn for ongoing early pregnancy loss:  TVUS (): gestational sac + faint yolk sac, no fetal pole  TVUS (): gestational sac + faint yolk sac, no fetal pole  TVUS (): gestational sac, no yolk sac or fetal pole.  HC    OB/GYN HISTORY:   Physician: Dr. Barron  Ob: c/s x2 2/2 to hx of myomectomy (, )  Gyn: Fibroids s/p LSC myomectomy x2 (age 19 and 22); denies ovarian cysts, abnormal pap smears, STIs   PMH: Denies    PSH:   - LSC myomectomy x2 (age 19 and 22)  - C/S x2 (, )  - Gastric bypass () with second surgery for "skin resection" after bypass - states not abdominoplasty  - Open hernia repair ()  - Ventral hernia repair with mesh (2017)  Meds: PNV, Tylenol  Allergies: NKDA        Vital Signs Last 24 Hrs  T(C): 37.1 (2023 12:51), Max: 37.1 (2023 12:51)  T(F): 98.7 (2023 12:51), Max: 98.7 (2023 12:51)  HR: 71 (2023 14:08) (65 - 71)  BP: 127/66 (2023 14:08) (124/80 - 133/69)  BP(mean): 95 (2023 13:14) (95 - 95)  RR: 18 (2023 14:08) (18 - 20)  SpO2: 97% (2023 14:08) (97% - 100%)    Parameters below as of 2023 14:08  Patient On (Oxygen Delivery Method): room air      PHYSICAL EXAM:   Gen: NAD, alert and oriented x 3  Cardiovascular: RRR  Respiratory: breathing comfortably on RA  Abd: soft, mildly tender to palpation in lower abdomen - however easily distractible no rebound, no guarding, non-distended, well healed midline vertical skin incision and horizontal lower abdominal incision  Pelvic: 0.5cm dilated no CMT, Uterus: approx 8 wk size, mildly tender to palpation, minimal bleeding on pad  Adnexa: non tender, no palpable masses  Speculum Exam: Approx 15cc blood in vaginal vault, no active bleeding from os.   Extremities: non-tender b/l, no edema   Skin: warm and well perfused  *Pelvic exam performed with chaperone present: ED tech: Johana    LABS:                        13.8   7.91  )-----------( 324      ( 2023 13:47 )             41.7     07-31    140  |  103  |  9   ----------------------------<  86  3.5   |  22  |  0.58    Ca    9.7      2023 13:47    TPro  7.8  /  Alb  4.9  /  TBili  0.6  /  DBili  x   /  AST  21  /  ALT  30  /  AlkPhos  72  07-    HC      Urinalysis Basic - ( 2023 13:47 )    Color: x / Appearance: x / SG: x / pH: x  Gluc: 86 mg/dL / Ketone: x  / Bili: x / Urobili: x   Blood: x / Protein: x / Nitrite: x   Leuk Esterase: x / RBC: x / WBC x   Sq Epi: x / Non Sq Epi: x / Bacteria: x        RADIOLOGY & ADDITIONAL STUDIES: GYN Consult Note    37y  at 11w4d by LMP  with known ongoing likely MAB presents with heavy vaginal bleeding and abdominal cramping since this AM.  Patient was previously seen in ED on  for vaginal bleeding (1 pad every 2 hours) and was discharged home at that time.  Reports that this AM, she began experiencing heavy vaginal bleeding (soaking 3 pads/hour) with passage of small clots from 8am-12pm when she came to ED.  Also endorses "contraction-like" cramps that are so painful they brought her to tears at home.  Stated she was unable to walk due to pain.  States she took [id number] Tylenol at home with no relief of the pain.  Endorses headache and mild dizziness, and some chills, but otherwise denies chest pain, shortness of breath, fevers.      Of note, on chart review, patient has been followed with private gyn for ongoing early pregnancy loss:  TVUS (): gestational sac + faint yolk sac, no fetal pole  TVUS (): gestational sac + faint yolk sac, no fetal pole  TVUS (): gestational sac, no yolk sac or fetal pole.  HC    OB/GYN HISTORY:   Physician: Dr. Barron  Ob: c/s x2 2/2 to hx of myomectomy (, )  Gyn: Fibroids s/p LSC myomectomy x2 (age 19 and 22); denies ovarian cysts, abnormal pap smears, STIs   PMH: Denies    PSH:   - LSC myomectomy x2 (age 19 and 22)  - C/S x2 (, )  - Gastric bypass () with second surgery for "skin resection" after bypass - states not abdominoplasty  - Open hernia repair ()  - Ventral hernia repair with mesh (2017)  Meds: PNV, Tylenol  Allergies: NKDA        Vital Signs Last 24 Hrs  T(C): 37.1 (2023 12:51), Max: 37.1 (2023 12:51)  T(F): 98.7 (2023 12:51), Max: 98.7 (2023 12:51)  HR: 71 (2023 14:08) (65 - 71)  BP: 127/66 (2023 14:08) (124/80 - 133/69)  BP(mean): 95 (2023 13:14) (95 - 95)  RR: 18 (2023 14:08) (18 - 20)  SpO2: 97% (2023 14:08) (97% - 100%)    Parameters below as of 2023 14:08  Patient On (Oxygen Delivery Method): room air      PHYSICAL EXAM:   Gen: NAD, alert and oriented x 3  Cardiovascular: RRR  Respiratory: breathing comfortably on RA  Abd: soft, mildly tender to palpation in lower abdomen - however easily distractible no rebound, no guarding, non-distended, well healed midline vertical skin incision and horizontal lower abdominal incision  Pelvic: 0.5cm dilated no CMT, Uterus: approx 8 wk size, mildly tender to palpation, minimal bleeding on pad  Adnexa: non tender, no palpable masses  Speculum Exam: Approx 15cc blood in vaginal vault, no active bleeding from os.   Extremities: non-tender b/l, no edema   Skin: warm and well perfused  *Pelvic exam performed with chaperone present: ED tech: Johana    LABS:                        13.8   7.91  )-----------( 324      ( 2023 13:47 )             41.7     07-31    140  |  103  |  9   ----------------------------<  86  3.5   |  22  |  0.58    Ca    9.7      2023 13:47    TPro  7.8  /  Alb  4.9  /  TBili  0.6  /  DBili  x   /  AST  21  /  ALT  30  /  AlkPhos  72  07-    HC      Urinalysis Basic - ( 2023 13:47 )    Color: x / Appearance: x / SG: x / pH: x  Gluc: 86 mg/dL / Ketone: x  / Bili: x / Urobili: x   Blood: x / Protein: x / Nitrite: x   Leuk Esterase: x / RBC: x / WBC x   Sq Epi: x / Non Sq Epi: x / Bacteria: x        RADIOLOGY & ADDITIONAL STUDIES:    FINDINGS:  Uterus: 10.8 x 5.1 x 4.2 cm. An intrauterine gestational sac with mean   sac diameter of 1.5 cm (previously 1.8 cm) with a thin echogenic   structure, possibly representing the yolk sac. In comparison to prior,   the gestational sac has migrated and is now within the lower uterine   segment/internal cervical os. No fetal pole identified. Estimated   gestational age is 5 weeks 6 days by gestational sac. Subchorionic   hematoma measures 1.1 x 0.5 x 0.7 cm.    Right ovary: 2.9 x 1.3 x 3.6 cm. Within normal limits. Corpus luteal   cyst. Normal arterial and venous waveforms.  Left ovary: 2.7 cm x 1.3 cm x 2.7 cm. Within normal limits. Corpus luteal   cyst. Normal arterial and venous waveforms.    Fluid: None.    IMPRESSION:  Gestational sac with a mean sac diameter of 1.5 cm with the absence of a   fetal pole. In comparison to prior, the gestational sac has migrated and   is now within the lower uterine segment/internal cervical os. This is   suggestive of a failed pregnancy. Recommend short interval follow-up   ultrasound and beta hCG.   GYN Consult Note    37y  at 11w4d by LMP  with known ongoing likely MAB presents with heavy vaginal bleeding and abdominal cramping since this AM.  Patient was previously seen in ED on  for vaginal bleeding (1 pad every 2 hours) and was discharged home at that time.  Reports that this AM, she began experiencing heavy vaginal bleeding (soaking 3 pads/hour) with passage of small clots from 8am-12pm when she came to ED.  Also endorses "contraction-like" cramps that are so painful they brought her to tears at home.  Stated she was unable to walk due to pain.  States she took [id number] Tylenol at home with no relief of the pain.  Endorses headache and mild dizziness, and some chills, but otherwise denies chest pain, shortness of breath, fevers.      Of note, on chart review, patient has been followed with private gyn for ongoing early pregnancy loss:  TVUS (): gestational sac + faint yolk sac, no fetal pole  TVUS (): gestational sac + faint yolk sac, no fetal pole  TVUS (): gestational sac, no yolk sac or fetal pole.  HC    OB/GYN HISTORY:   Physician: Dr. Barron  Ob: c/s x2 2/2 to hx of myomectomy (, )  Gyn: Fibroids s/p LSC myomectomy x2 (age 19 and 22); denies ovarian cysts, abnormal pap smears, STIs   PMH: Denies    PSH:   - LSC myomectomy x2 (age 19 and 22)  - C/S x2 (, )  - Gastric bypass () with second surgery for "skin resection" after bypass - states not abdominoplasty  - Open hernia repair ()  - Ventral hernia repair with mesh (2017)  Meds: PNV, Tylenol  Allergies: NKDA        Vital Signs Last 24 Hrs  T(C): 37.1 (2023 12:51), Max: 37.1 (2023 12:51)  T(F): 98.7 (2023 12:51), Max: 98.7 (2023 12:51)  HR: 71 (2023 14:08) (65 - 71)  BP: 127/66 (2023 14:08) (124/80 - 133/69)  BP(mean): 95 (2023 13:14) (95 - 95)  RR: 18 (2023 14:08) (18 - 20)  SpO2: 97% (2023 14:08) (97% - 100%)    Parameters below as of 2023 14:08  Patient On (Oxygen Delivery Method): room air      PHYSICAL EXAM:   Gen: NAD, alert and oriented x 3  Cardiovascular: RRR  Respiratory: breathing comfortably on RA  Abd: soft, mildly tender to palpation in lower abdomen - however easily distractible no rebound, no guarding, non-distended, well healed midline vertical skin incision and horizontal lower abdominal incision  Pelvic: 0.5cm dilated no CMT, Uterus: approx 8 wk size, mildly tender to palpation, minimal bleeding on pad  Adnexa: non tender, no palpable masses  Speculum Exam: Approx 15cc blood in vaginal vault, no active bleeding from os.   Extremities: non-tender b/l, no edema   Skin: warm and well perfused  *Pelvic exam performed with chaperone present: ED tech: Johana    LABS:                        13.8   7.91  )-----------( 324      ( 2023 13:47 )             41.7     07-31    140  |  103  |  9   ----------------------------<  86  3.5   |  22  |  0.58    Ca    9.7      2023 13:47    TPro  7.8  /  Alb  4.9  /  TBili  0.6  /  DBili  x   /  AST  21  /  ALT  30  /  AlkPhos  72  07-    HC      Urinalysis Basic - ( 2023 13:47 )    Color: x / Appearance: x / SG: x / pH: x  Gluc: 86 mg/dL / Ketone: x  / Bili: x / Urobili: x   Blood: x / Protein: x / Nitrite: x   Leuk Esterase: x / RBC: x / WBC x   Sq Epi: x / Non Sq Epi: x / Bacteria: x        RADIOLOGY & ADDITIONAL STUDIES:    FINDINGS:  Uterus: 10.8 x 5.1 x 4.2 cm. An intrauterine gestational sac with mean   sac diameter of 1.5 cm (previously 1.8 cm) with a thin echogenic   structure, possibly representing the yolk sac. In comparison to prior,   the gestational sac has migrated and is now within the lower uterine   segment/internal cervical os. No fetal pole identified. Estimated   gestational age is 5 weeks 6 days by gestational sac. Subchorionic   hematoma measures 1.1 x 0.5 x 0.7 cm.    Right ovary: 2.9 x 1.3 x 3.6 cm. Within normal limits. Corpus luteal   cyst. Normal arterial and venous waveforms.  Left ovary: 2.7 cm x 1.3 cm x 2.7 cm. Within normal limits. Corpus luteal   cyst. Normal arterial and venous waveforms.    Fluid: None.    IMPRESSION:  Gestational sac with a mean sac diameter of 1.5 cm with the absence of a   fetal pole. In comparison to prior, the gestational sac has migrated and   is now within the lower uterine segment/internal cervical os. This is   suggestive of a failed pregnancy. Recommend short interval follow-up   ultrasound and beta hCG.   GYN Consult Note    37y  at 11w4d by LMP  with known ongoing likely MAB presents with heavy vaginal bleeding and abdominal cramping since this AM.  Patient was previously seen in ED on  for vaginal bleeding (1 pad every 2 hours) and was discharged home at that time.  Reports that this AM, she began experiencing heavy vaginal bleeding (soaking 3 pads/hour) with passage of small clots from 8am-12pm when she came to ED.  Also endorses "contraction-like" cramps that are so painful they brought her to tears at home.  Stated she was unable to walk due to pain.  States she took [id number] Tylenol at home with no relief of the pain.  Endorses headache and mild dizziness, and some chills, but otherwise denies chest pain, shortness of breath, fevers.      Of note, on chart review, patient has been followed with private gyn for ongoing early pregnancy loss:  TVUS (): gestational sac + faint yolk sac, no fetal pole  TVUS (): gestational sac + faint yolk sac, no fetal pole  TVUS (): gestational sac, no yolk sac or fetal pole.  HC    OB/GYN HISTORY:   Physician: Dr. Barron  Ob: c/s x2 2/2 to hx of myomectomy (, )  Gyn: Fibroids s/p LSC myomectomy x2 (age 19 and 22); denies ovarian cysts, abnormal pap smears, STIs   PMH: Denies    PSH:   - LSC myomectomy x2 (age 19 and 22)  - C/S x2 (, )  - Gastric bypass () with second surgery for "skin resection" after bypass - states not abdominoplasty  - Open hernia repair ()  - Ventral hernia repair with mesh (2017)  Meds: PNV, Tylenol  Allergies: NKDA        Vital Signs Last 24 Hrs  T(C): 37.1 (2023 12:51), Max: 37.1 (2023 12:51)  T(F): 98.7 (2023 12:51), Max: 98.7 (2023 12:51)  HR: 71 (2023 14:08) (65 - 71)  BP: 127/66 (2023 14:08) (124/80 - 133/69)  BP(mean): 95 (2023 13:14) (95 - 95)  RR: 18 (2023 14:08) (18 - 20)  SpO2: 97% (2023 14:08) (97% - 100%)    Parameters below as of 2023 14:08  Patient On (Oxygen Delivery Method): room air      PHYSICAL EXAM:   Gen: NAD, alert and oriented x 3  Cardiovascular: RRR  Respiratory: breathing comfortably on RA  Abd: soft, mildly tender to palpation in lower abdomen - however easily distractible no rebound, no guarding, non-distended, well healed midline vertical skin incision and horizontal lower abdominal incision  Pelvic: 0.5cm dilated no CMT, Uterus: approx 8 wk size, mildly tender to palpation, minimal bleeding on pad  Adnexa: non tender, no palpable masses  Speculum Exam: Approx 15cc blood in vaginal vault, no active bleeding from os.   Extremities: non-tender b/l, no edema   Skin: warm and well perfused  *Pelvic exam performed with chaperone present: ED tech: Johana    LABS:                        13.8   7.91  )-----------( 324      ( 2023 13:47 )             41.7     07-31    140  |  103  |  9   ----------------------------<  86  3.5   |  22  |  0.58    Ca    9.7      2023 13:47    TPro  7.8  /  Alb  4.9  /  TBili  0.6  /  DBili  x   /  AST  21  /  ALT  30  /  AlkPhos  72  07-    HC      Urinalysis Basic - ( 2023 13:47 )    Color: x / Appearance: x / SG: x / pH: x  Gluc: 86 mg/dL / Ketone: x  / Bili: x / Urobili: x   Blood: x / Protein: x / Nitrite: x   Leuk Esterase: x / RBC: x / WBC x   Sq Epi: x / Non Sq Epi: x / Bacteria: x        RADIOLOGY & ADDITIONAL STUDIES:    FINDINGS:  Uterus: 10.8 x 5.1 x 4.2 cm. An intrauterine gestational sac with mean   sac diameter of 1.5 cm (previously 1.8 cm) with a thin echogenic   structure, possibly representing the yolk sac. In comparison to prior,   the gestational sac has migrated and is now within the lower uterine   segment/internal cervical os. No fetal pole identified. Estimated   gestational age is 5 weeks 6 days by gestational sac. Subchorionic   hematoma measures 1.1 x 0.5 x 0.7 cm.    Right ovary: 2.9 x 1.3 x 3.6 cm. Within normal limits. Corpus luteal   cyst. Normal arterial and venous waveforms.  Left ovary: 2.7 cm x 1.3 cm x 2.7 cm. Within normal limits. Corpus luteal   cyst. Normal arterial and venous waveforms.    Fluid: None.    IMPRESSION:  Gestational sac with a mean sac diameter of 1.5 cm with the absence of a   fetal pole. In comparison to prior, the gestational sac has migrated and   is now within the lower uterine segment/internal cervical os. This is   suggestive of a failed pregnancy. Recommend short interval follow-up   ultrasound and beta hCG.

## 2023-07-31 NOTE — ED ADULT NURSE NOTE - OBJECTIVE STATEMENT
38 yo F presents to ED A+OX3 c/o pelvic cramping and vaginal bleeding. Patient states she is 10 weeks pregnant, was seen in the ED a few days ago and told she was miscarrying. Patient states at approx. 9am this morning she developed sudden onset of pelvic cramping that she describes at "contractions." States the pain comes in severe waves. Also reports heavy vaginal bleeding, is using 3 pads an hour. Patient appears uncomfortable. Breathing spontaneous and unlabored on room air. Skin warm pink and dry. States she took 3 regular strength Tylenol prior to arrival without any improvement in pain. Mother at bedside.

## 2023-07-31 NOTE — ED ADULT NURSE NOTE - NSFALLUNIVINTERV_ED_ALL_ED
Bed/Stretcher in lowest position, wheels locked, appropriate side rails in place/Call bell, personal items and telephone in reach/Instruct patient to call for assistance before getting out of bed/chair/stretcher/Non-slip footwear applied when patient is off stretcher/[place] to call system/Physically safe environment - no spills, clutter or unnecessary equipment/Purposeful proactive rounding/Room/bathroom lighting operational, light cord in reach Bed/Stretcher in lowest position, wheels locked, appropriate side rails in place/Call bell, personal items and telephone in reach/Instruct patient to call for assistance before getting out of bed/chair/stretcher/Non-slip footwear applied when patient is off stretcher/[place] to call system/Physically safe environment - no spills, clutter or unnecessary equipment/Purposeful proactive rounding/Room/bathroom lighting operational, light cord in reach Bed/Stretcher in lowest position, wheels locked, appropriate side rails in place/Call bell, personal items and telephone in reach/Instruct patient to call for assistance before getting out of bed/chair/stretcher/Non-slip footwear applied when patient is off stretcher/[place] to call system/Physically safe environment - no spills, clutter or unnecessary equipment/Purposeful proactive rounding/Room/bathroom lighting operational, light cord in reach

## 2023-08-01 VITALS
DIASTOLIC BLOOD PRESSURE: 62 MMHG | RESPIRATION RATE: 18 BRPM | TEMPERATURE: 98 F | OXYGEN SATURATION: 100 % | HEART RATE: 65 BPM | SYSTOLIC BLOOD PRESSURE: 102 MMHG

## 2023-08-01 PROCEDURE — 84702 CHORIONIC GONADOTROPIN TEST: CPT

## 2023-08-01 PROCEDURE — 93975 VASCULAR STUDY: CPT

## 2023-08-01 PROCEDURE — 96374 THER/PROPH/DIAG INJ IV PUSH: CPT

## 2023-08-01 PROCEDURE — 99285 EMERGENCY DEPT VISIT HI MDM: CPT | Mod: 25

## 2023-08-01 PROCEDURE — 80053 COMPREHEN METABOLIC PANEL: CPT

## 2023-08-01 PROCEDURE — 85025 COMPLETE CBC W/AUTO DIFF WBC: CPT

## 2023-08-01 PROCEDURE — 36415 COLL VENOUS BLD VENIPUNCTURE: CPT

## 2023-08-01 PROCEDURE — 76830 TRANSVAGINAL US NON-OB: CPT

## 2023-08-01 RX ORDER — OXYCODONE HYDROCHLORIDE 5 MG/1
1 TABLET ORAL
Qty: 6 | Refills: 0
Start: 2023-08-01 | End: 2023-08-02

## 2023-08-01 RX ADMIN — MIFEPRISTONE 200 MILLIGRAM(S): 300 TABLET, FILM COATED ORAL at 00:45

## 2023-08-01 NOTE — ED POST DISCHARGE NOTE - RESULT SUMMARY
Incidental/recommended f/u list. OBGYN consulted for results. Pt undergoing medical abortive therapy. Advised on need for OBGYN f/u. No further contact at this time. - Benny Verdin PA-C

## 2023-08-02 PROBLEM — Z00.00 ENCOUNTER FOR PREVENTIVE HEALTH EXAMINATION: Status: ACTIVE | Noted: 2023-08-02

## 2023-08-07 ENCOUNTER — APPOINTMENT (OUTPATIENT)
Dept: OBGYN | Facility: CLINIC | Age: 38
End: 2023-08-07
Payer: MEDICAID

## 2023-08-07 ENCOUNTER — ASOB RESULT (OUTPATIENT)
Age: 38
End: 2023-08-07

## 2023-08-07 VITALS
DIASTOLIC BLOOD PRESSURE: 76 MMHG | BODY MASS INDEX: 33.97 KG/M2 | HEART RATE: 68 BPM | WEIGHT: 199 LBS | HEIGHT: 64 IN | SYSTOLIC BLOOD PRESSURE: 110 MMHG

## 2023-08-07 DIAGNOSIS — O03.9 COMPLETE OR UNSPECIFIED SPONTANEOUS ABORTION W/OUT COMPLICATION: ICD-10-CM

## 2023-08-07 PROCEDURE — 76817 TRANSVAGINAL US OBSTETRIC: CPT

## 2023-08-07 PROCEDURE — 99213 OFFICE O/P EST LOW 20 MIN: CPT

## 2023-08-07 RX ORDER — PNV 119/IRON FUM/FOLIC ACID 29 MG-1 MG
TABLET ORAL DAILY
Qty: 30 | Refills: 11 | Status: ACTIVE | COMMUNITY
Start: 2023-08-07 | End: 1900-01-01

## 2023-08-07 NOTE — PLAN
[FreeTextEntry1] : 38 y/o for follow up for spontaneous miscarriage  Plan: - PNV sent to pharmacy - Will follow up patient's partner's carrier screening - RTO PRN

## 2023-08-07 NOTE — HISTORY OF PRESENT ILLNESS
[FreeTextEntry1] : 38 y/o presents for follow up after a spontaneous miscarriage. TVUS shows no evidence of retained POCs. Patient also feels well, reports bleeding is minimal, with significant decrease. Was seen in [facility] ER last week with incomplete miscarriage and given misoprostol. Otherwise no concerns.

## 2023-08-07 NOTE — PHYSICAL EXAM
[Chaperone Present] : A chaperone was present in the examining room during all aspects of the physical examination [Appropriately responsive] : appropriately responsive [No Acute Distress] : no acute distress [Alert] : alert [Regular Rate Rhythm] : regular rate rhythm [Clear to Auscultation B/L] : clear to auscultation bilaterally [Soft] : soft [Non-tender] : non-tender [Non-distended] : non-distended [Oriented x3] : oriented x3

## 2023-09-26 ENCOUNTER — APPOINTMENT (OUTPATIENT)
Dept: OBGYN | Facility: CLINIC | Age: 38
End: 2023-09-26
Payer: MEDICAID

## 2023-09-26 VITALS
HEIGHT: 64 IN | SYSTOLIC BLOOD PRESSURE: 119 MMHG | DIASTOLIC BLOOD PRESSURE: 73 MMHG | BODY MASS INDEX: 33.8 KG/M2 | HEART RATE: 57 BPM | WEIGHT: 198 LBS

## 2023-09-26 DIAGNOSIS — N89.8 OTHER SPECIFIED NONINFLAMMATORY DISORDERS OF VAGINA: ICD-10-CM

## 2023-09-26 DIAGNOSIS — N39.3 STRESS INCONTINENCE (FEMALE) (MALE): ICD-10-CM

## 2023-09-26 PROCEDURE — 99214 OFFICE O/P EST MOD 30 MIN: CPT

## 2023-09-26 RX ORDER — FLUCONAZOLE 150 MG/1
150 TABLET ORAL
Qty: 2 | Refills: 1 | Status: ACTIVE | COMMUNITY
Start: 2023-09-26 | End: 1900-01-01

## 2023-09-27 DIAGNOSIS — N76.0 ACUTE VAGINITIS: ICD-10-CM

## 2023-09-27 DIAGNOSIS — B96.89 ACUTE VAGINITIS: ICD-10-CM

## 2023-09-27 LAB
CANDIDA VAG CYTO: DETECTED
G VAGINALIS+PREV SP MTYP VAG QL MICRO: DETECTED
T VAGINALIS VAG QL WET PREP: NOT DETECTED

## 2023-09-27 RX ORDER — METRONIDAZOLE 500 MG/1
500 TABLET ORAL TWICE DAILY
Qty: 14 | Refills: 0 | Status: ACTIVE | COMMUNITY
Start: 2023-09-27 | End: 1900-01-01

## 2023-10-02 LAB — BACTERIA UR CULT: NORMAL

## 2023-12-28 ENCOUNTER — ASOB RESULT (OUTPATIENT)
Age: 38
End: 2023-12-28

## 2023-12-28 ENCOUNTER — APPOINTMENT (OUTPATIENT)
Dept: OBGYN | Facility: CLINIC | Age: 38
End: 2023-12-28
Payer: MEDICAID

## 2023-12-28 VITALS
BODY MASS INDEX: 33.12 KG/M2 | HEART RATE: 66 BPM | WEIGHT: 194 LBS | DIASTOLIC BLOOD PRESSURE: 77 MMHG | SYSTOLIC BLOOD PRESSURE: 138 MMHG | HEIGHT: 64 IN

## 2023-12-28 PROCEDURE — 99213 OFFICE O/P EST LOW 20 MIN: CPT

## 2023-12-28 PROCEDURE — 99203 OFFICE O/P NEW LOW 30 MIN: CPT

## 2023-12-28 PROCEDURE — 76816 OB US FOLLOW-UP PER FETUS: CPT

## 2023-12-28 NOTE — HISTORY OF PRESENT ILLNESS
[FreeTextEntry1] : 37 y/o P2 LMP 10/17/23 presents with missed menses and positive pregnancy test last week. TVUS confirms live SIUP at 11w4d with EDILMA 24 not consistent with LMP. This is a desired pregnancy. Reports breast tenderness, had one episode of vaginal bleeding, otherwise feels well.  Previous  x2; reports GDMA and HTN disorder in second pregnancy

## 2023-12-28 NOTE — PLAN
[FreeTextEntry1] : 37 y/o P2 at 11w4d (EDILMA 7/14/24 by 1st trimester sono) for inital prenatal visit  Plan: - Welcome letter provided - Information for patient portal given - Pap up to date; GC/CT, urine culture collected - 1st OB labs collected - NIPS today; recommend NT in 1-2 weeks - early pregnancy precautions reviewed  - recommend ASA for PEC ppx - RTO in 4 weeks

## 2023-12-28 NOTE — PHYSICAL EXAM
[Chaperone Present] : A chaperone was present in the examining room during all aspects of the physical examination [Appropriately responsive] : appropriately responsive [Alert] : alert [No Acute Distress] : no acute distress [Regular Rate Rhythm] : regular rate rhythm [Clear to Auscultation B/L] : clear to auscultation bilaterally [Soft] : soft [Non-tender] : non-tender [Non-distended] : non-distended [No Lesions] : no lesions [No Mass] : no mass [Oriented x3] : oriented x3 [Examination Of The Breasts] : a normal appearance [Normal] : normal [No Masses] : no breast masses were palpable

## 2023-12-29 LAB
ABO + RH PNL BLD: NORMAL
BASOPHILS # BLD AUTO: 0.05 K/UL
BASOPHILS NFR BLD AUTO: 0.7 %
BLD GP AB SCN SERPL QL: NORMAL
EOSINOPHIL # BLD AUTO: 0.38 K/UL
EOSINOPHIL NFR BLD AUTO: 5.7 %
ESTIMATED AVERAGE GLUCOSE: 108 MG/DL
HBA1C MFR BLD HPLC: 5.4 %
HBV SURFACE AG SER QL: NONREACTIVE
HCT VFR BLD CALC: 40.1 %
HCV AB SER QL: NONREACTIVE
HCV S/CO RATIO: 0.14 S/CO
HGB BLD-MCNC: 13.7 G/DL
HIV1+2 AB SPEC QL IA.RAPID: NONREACTIVE
IMM GRANULOCYTES NFR BLD AUTO: 1 %
LYMPHOCYTES # BLD AUTO: 1.86 K/UL
LYMPHOCYTES NFR BLD AUTO: 27.7 %
MAN DIFF?: NORMAL
MCHC RBC-ENTMCNC: 30.2 PG
MCHC RBC-ENTMCNC: 34.2 GM/DL
MCV RBC AUTO: 88.3 FL
MEV IGG FLD QL IA: 86.5 AU/ML
MEV IGG+IGM SER-IMP: POSITIVE
MONOCYTES # BLD AUTO: 0.42 K/UL
MONOCYTES NFR BLD AUTO: 6.3 %
NEUTROPHILS # BLD AUTO: 3.93 K/UL
NEUTROPHILS NFR BLD AUTO: 58.6 %
PLATELET # BLD AUTO: 325 K/UL
RBC # BLD: 4.54 M/UL
RBC # FLD: 13.2 %
RUBV IGG FLD-ACNC: 1.3 INDEX
RUBV IGG SER-IMP: POSITIVE
T PALLIDUM AB SER QL IA: NEGATIVE
VZV AB TITR SER: POSITIVE
VZV IGG SER IF-ACNC: 463.9 INDEX
WBC # FLD AUTO: 6.71 K/UL

## 2023-12-30 LAB
BACTERIA UR CULT: NORMAL
C TRACH RRNA SPEC QL NAA+PROBE: NOT DETECTED
HGB A MFR BLD: 97.4 %
HGB A2 MFR BLD: 2.6 %
HGB FRACT BLD-IMP: NORMAL
LEAD BLD-MCNC: <1 UG/DL
N GONORRHOEA RRNA SPEC QL NAA+PROBE: NOT DETECTED
SOURCE AMPLIFICATION: NORMAL

## 2024-01-02 DIAGNOSIS — Z32.00 ENCOUNTER FOR PREGNANCY TEST, RESULT UNKNOWN: ICD-10-CM

## 2024-01-03 LAB — FMR1 GENE MUT ANL BLD/T: NORMAL

## 2024-01-04 ENCOUNTER — ASOB RESULT (OUTPATIENT)
Age: 39
End: 2024-01-04

## 2024-01-04 ENCOUNTER — APPOINTMENT (OUTPATIENT)
Dept: OBGYN | Facility: CLINIC | Age: 39
End: 2024-01-04
Payer: MEDICAID

## 2024-01-04 ENCOUNTER — APPOINTMENT (OUTPATIENT)
Dept: ANTEPARTUM | Facility: CLINIC | Age: 39
End: 2024-01-04
Payer: MEDICAID

## 2024-01-04 LAB — AR GENE MUT ANL BLD/T: NORMAL

## 2024-01-04 PROCEDURE — 99211 OFF/OP EST MAY X REQ PHY/QHP: CPT

## 2024-01-04 PROCEDURE — 76801 OB US < 14 WKS SINGLE FETUS: CPT

## 2024-01-04 PROCEDURE — 76813 OB US NUCHAL MEAS 1 GEST: CPT | Mod: 59

## 2024-01-07 LAB — CFTR MUT TESTED BLD/T: NEGATIVE

## 2024-01-25 ENCOUNTER — APPOINTMENT (OUTPATIENT)
Dept: OBGYN | Facility: CLINIC | Age: 39
End: 2024-01-25
Payer: MEDICAID

## 2024-01-25 VITALS
WEIGHT: 194 LBS | HEART RATE: 79 BPM | SYSTOLIC BLOOD PRESSURE: 112 MMHG | BODY MASS INDEX: 33.12 KG/M2 | DIASTOLIC BLOOD PRESSURE: 70 MMHG | HEIGHT: 64 IN

## 2024-01-25 PROCEDURE — 0502F SUBSEQUENT PRENATAL CARE: CPT

## 2024-01-31 LAB
AFP MOM: 1.25
AFP VALUE: 34 NG/ML
ALPHA FETOPROTEIN SERUM COMMENT: NORMAL
ALPHA FETOPROTEIN SERUM INTERPRETATION: NORMAL
ALPHA FETOPROTEIN SERUM RESULTS: NORMAL
ALPHA FETOPROTEIN SERUM TEST RESULTS: NORMAL
GESTATIONAL AGE BASED ON: NORMAL
GESTATIONAL AGE ON COLLECTION DATE: 15.6 WEEKS
INSULIN DEP DIABETES: NO
MATERNAL AGE AT EDD AFP: 39.9 YR
MULTIPLE GESTATION: NO
OSBR RISK 1 IN: 5576
RACE: NORMAL
WEIGHT AFP: 194 LBS

## 2024-02-29 ENCOUNTER — APPOINTMENT (OUTPATIENT)
Dept: ANTEPARTUM | Facility: CLINIC | Age: 39
End: 2024-02-29
Payer: MEDICAID

## 2024-02-29 ENCOUNTER — APPOINTMENT (OUTPATIENT)
Dept: OBGYN | Facility: CLINIC | Age: 39
End: 2024-02-29
Payer: MEDICAID

## 2024-02-29 ENCOUNTER — ASOB RESULT (OUTPATIENT)
Age: 39
End: 2024-02-29

## 2024-02-29 VITALS
BODY MASS INDEX: 33.46 KG/M2 | HEIGHT: 64 IN | WEIGHT: 196 LBS | HEART RATE: 76 BPM | DIASTOLIC BLOOD PRESSURE: 69 MMHG | SYSTOLIC BLOOD PRESSURE: 111 MMHG

## 2024-02-29 PROCEDURE — 0502F SUBSEQUENT PRENATAL CARE: CPT

## 2024-02-29 PROCEDURE — 76811 OB US DETAILED SNGL FETUS: CPT

## 2024-03-11 ENCOUNTER — APPOINTMENT (OUTPATIENT)
Dept: ANTEPARTUM | Facility: CLINIC | Age: 39
End: 2024-03-11
Payer: MEDICAID

## 2024-03-11 ENCOUNTER — ASOB RESULT (OUTPATIENT)
Age: 39
End: 2024-03-11

## 2024-03-11 PROCEDURE — 76816 OB US FOLLOW-UP PER FETUS: CPT

## 2024-03-26 ENCOUNTER — ASOB RESULT (OUTPATIENT)
Age: 39
End: 2024-03-26

## 2024-03-26 ENCOUNTER — APPOINTMENT (OUTPATIENT)
Dept: ANTEPARTUM | Facility: CLINIC | Age: 39
End: 2024-03-26
Payer: MEDICAID

## 2024-03-26 PROCEDURE — 76816 OB US FOLLOW-UP PER FETUS: CPT

## 2024-03-28 ENCOUNTER — APPOINTMENT (OUTPATIENT)
Dept: OBGYN | Facility: CLINIC | Age: 39
End: 2024-03-28
Payer: MEDICAID

## 2024-03-28 ENCOUNTER — NON-APPOINTMENT (OUTPATIENT)
Age: 39
End: 2024-03-28

## 2024-03-28 VITALS
DIASTOLIC BLOOD PRESSURE: 75 MMHG | SYSTOLIC BLOOD PRESSURE: 121 MMHG | HEART RATE: 72 BPM | WEIGHT: 195 LBS | BODY MASS INDEX: 33.29 KG/M2 | HEIGHT: 64 IN

## 2024-03-28 DIAGNOSIS — O09.522 SUPERVISION OF ELDERLY MULTIGRAVIDA, SECOND TRIMESTER: ICD-10-CM

## 2024-03-28 PROCEDURE — 0502F SUBSEQUENT PRENATAL CARE: CPT

## 2024-04-25 ENCOUNTER — APPOINTMENT (OUTPATIENT)
Dept: OBGYN | Facility: CLINIC | Age: 39
End: 2024-04-25
Payer: MEDICAID

## 2024-04-25 VITALS
SYSTOLIC BLOOD PRESSURE: 112 MMHG | WEIGHT: 198 LBS | BODY MASS INDEX: 33.8 KG/M2 | HEIGHT: 64 IN | HEART RATE: 80 BPM | DIASTOLIC BLOOD PRESSURE: 79 MMHG

## 2024-04-25 DIAGNOSIS — Z23 ENCOUNTER FOR IMMUNIZATION: ICD-10-CM

## 2024-04-25 LAB
BASOPHILS # BLD AUTO: 0.03 K/UL
BASOPHILS NFR BLD AUTO: 0.4 %
EOSINOPHIL # BLD AUTO: 0.31 K/UL
EOSINOPHIL NFR BLD AUTO: 4 %
HCT VFR BLD CALC: 35.2 %
HGB BLD-MCNC: 12.3 G/DL
IMM GRANULOCYTES NFR BLD AUTO: 0.4 %
LYMPHOCYTES # BLD AUTO: 1.15 K/UL
LYMPHOCYTES NFR BLD AUTO: 14.8 %
MAN DIFF?: NORMAL
MCHC RBC-ENTMCNC: 30.2 PG
MCHC RBC-ENTMCNC: 34.9 GM/DL
MCV RBC AUTO: 86.5 FL
MONOCYTES # BLD AUTO: 0.48 K/UL
MONOCYTES NFR BLD AUTO: 6.2 %
NEUTROPHILS # BLD AUTO: 5.75 K/UL
NEUTROPHILS NFR BLD AUTO: 74.2 %
PLATELET # BLD AUTO: 285 K/UL
RBC # BLD: 4.07 M/UL
RBC # FLD: 12.9 %
WBC # FLD AUTO: 7.75 K/UL

## 2024-04-25 PROCEDURE — 90715 TDAP VACCINE 7 YRS/> IM: CPT

## 2024-04-25 PROCEDURE — 90471 IMMUNIZATION ADMIN: CPT

## 2024-04-25 PROCEDURE — 0502F SUBSEQUENT PRENATAL CARE: CPT

## 2024-04-26 LAB
GLUCOSE 1H P 50 G GLC PO SERPL-MCNC: 120 MG/DL
T PALLIDUM AB SER QL IA: NEGATIVE

## 2024-05-09 ENCOUNTER — APPOINTMENT (OUTPATIENT)
Dept: OBGYN | Facility: CLINIC | Age: 39
End: 2024-05-09
Payer: MEDICAID

## 2024-05-09 VITALS
SYSTOLIC BLOOD PRESSURE: 126 MMHG | BODY MASS INDEX: 34.31 KG/M2 | HEART RATE: 101 BPM | DIASTOLIC BLOOD PRESSURE: 80 MMHG | HEIGHT: 64 IN | WEIGHT: 201 LBS

## 2024-05-09 PROCEDURE — 0502F SUBSEQUENT PRENATAL CARE: CPT

## 2024-05-24 PROBLEM — Z00.00 ENCOUNTER FOR PREVENTIVE HEALTH EXAMINATION: Status: ACTIVE | Noted: 2024-05-24

## 2024-05-30 ENCOUNTER — APPOINTMENT (OUTPATIENT)
Dept: OBGYN | Facility: CLINIC | Age: 39
End: 2024-05-30
Payer: MEDICAID

## 2024-05-30 ENCOUNTER — APPOINTMENT (OUTPATIENT)
Dept: ANTEPARTUM | Facility: CLINIC | Age: 39
End: 2024-05-30
Payer: MEDICAID

## 2024-05-30 ENCOUNTER — ASOB RESULT (OUTPATIENT)
Age: 39
End: 2024-05-30

## 2024-05-30 VITALS
BODY MASS INDEX: 34.49 KG/M2 | DIASTOLIC BLOOD PRESSURE: 59 MMHG | WEIGHT: 202 LBS | HEIGHT: 64 IN | SYSTOLIC BLOOD PRESSURE: 106 MMHG | HEART RATE: 75 BPM

## 2024-05-30 PROCEDURE — 76819 FETAL BIOPHYS PROFIL W/O NST: CPT | Mod: 59

## 2024-05-30 PROCEDURE — 0502F SUBSEQUENT PRENATAL CARE: CPT

## 2024-05-30 PROCEDURE — 76816 OB US FOLLOW-UP PER FETUS: CPT

## 2024-06-12 ENCOUNTER — APPOINTMENT (OUTPATIENT)
Dept: MRI IMAGING | Facility: HOSPITAL | Age: 39
End: 2024-06-12

## 2024-06-12 ENCOUNTER — OUTPATIENT (OUTPATIENT)
Dept: OUTPATIENT SERVICES | Facility: HOSPITAL | Age: 39
LOS: 1 days | End: 2024-06-12
Payer: SUBSIDIZED

## 2024-06-12 DIAGNOSIS — Z00.00 ENCOUNTER FOR GENERAL ADULT MEDICAL EXAMINATION WITHOUT ABNORMAL FINDINGS: ICD-10-CM

## 2024-06-12 DIAGNOSIS — Z00.6 ENCOUNTER FOR EXAMINATION FOR NORMAL COMPARISON AND CONTROL IN CLINICAL RESEARCH PROGRAM: ICD-10-CM

## 2024-06-12 PROCEDURE — 70551 MRI BRAIN STEM W/O DYE: CPT

## 2024-06-12 PROCEDURE — 70551 MRI BRAIN STEM W/O DYE: CPT | Mod: 26

## 2024-06-13 ENCOUNTER — APPOINTMENT (OUTPATIENT)
Dept: OBGYN | Facility: CLINIC | Age: 39
End: 2024-06-13
Payer: MEDICAID

## 2024-06-13 VITALS
HEIGHT: 64 IN | WEIGHT: 204 LBS | DIASTOLIC BLOOD PRESSURE: 80 MMHG | HEART RATE: 69 BPM | BODY MASS INDEX: 34.83 KG/M2 | SYSTOLIC BLOOD PRESSURE: 116 MMHG

## 2024-06-13 PROCEDURE — 0502F SUBSEQUENT PRENATAL CARE: CPT

## 2024-06-14 LAB
ALBUMIN SERPL ELPH-MCNC: 3.5 G/DL
ALP BLD-CCNC: 183 U/L
ALT SERPL-CCNC: 33 U/L
ANION GAP SERPL CALC-SCNC: 11 MMOL/L
AST SERPL-CCNC: 28 U/L
BILIRUB SERPL-MCNC: 0.3 MG/DL
BUN SERPL-MCNC: 8 MG/DL
CALCIUM SERPL-MCNC: 8.8 MG/DL
CHLORIDE SERPL-SCNC: 104 MMOL/L
CO2 SERPL-SCNC: 21 MMOL/L
CREAT SERPL-MCNC: 0.52 MG/DL
EGFR: 121 ML/MIN/1.73M2
GLUCOSE SERPL-MCNC: 69 MG/DL
HIV1+2 AB SPEC QL IA.RAPID: NONREACTIVE
POTASSIUM SERPL-SCNC: 4.2 MMOL/L
PROT SERPL-MCNC: 6 G/DL
SODIUM SERPL-SCNC: 137 MMOL/L

## 2024-06-20 ENCOUNTER — NON-APPOINTMENT (OUTPATIENT)
Age: 39
End: 2024-06-20

## 2024-06-20 ENCOUNTER — APPOINTMENT (OUTPATIENT)
Dept: OBGYN | Facility: CLINIC | Age: 39
End: 2024-06-20
Payer: MEDICAID

## 2024-06-20 VITALS
SYSTOLIC BLOOD PRESSURE: 115 MMHG | BODY MASS INDEX: 35.17 KG/M2 | HEIGHT: 64 IN | DIASTOLIC BLOOD PRESSURE: 76 MMHG | WEIGHT: 206 LBS | HEART RATE: 70 BPM

## 2024-06-20 DIAGNOSIS — O26.643 INTRAHEPATIC CHOLESTASIS OF PREGNANCY, THIRD TRIMESTER: ICD-10-CM

## 2024-06-20 LAB — BILE AC SER-MCNC: 13.5 UMOL/L

## 2024-06-20 PROCEDURE — 0502F SUBSEQUENT PRENATAL CARE: CPT

## 2024-06-20 RX ORDER — URSODIOL 300 MG/1
300 CAPSULE ORAL
Qty: 90 | Refills: 0 | Status: ACTIVE | COMMUNITY
Start: 2024-06-20 | End: 1900-01-01

## 2024-06-24 ENCOUNTER — ASOB RESULT (OUTPATIENT)
Age: 39
End: 2024-06-24

## 2024-06-24 ENCOUNTER — OUTPATIENT (OUTPATIENT)
Dept: OUTPATIENT SERVICES | Facility: HOSPITAL | Age: 39
LOS: 1 days | End: 2024-06-24

## 2024-06-24 ENCOUNTER — APPOINTMENT (OUTPATIENT)
Dept: ANTEPARTUM | Facility: CLINIC | Age: 39
End: 2024-06-24
Payer: MEDICAID

## 2024-06-24 VITALS
HEIGHT: 64 IN | DIASTOLIC BLOOD PRESSURE: 76 MMHG | HEART RATE: 65 BPM | OXYGEN SATURATION: 97 % | WEIGHT: 210.1 LBS | RESPIRATION RATE: 18 BRPM | TEMPERATURE: 98 F | SYSTOLIC BLOOD PRESSURE: 110 MMHG

## 2024-06-24 DIAGNOSIS — Z98.82 BREAST IMPLANT STATUS: Chronic | ICD-10-CM

## 2024-06-24 DIAGNOSIS — Z98.891 HISTORY OF UTERINE SCAR FROM PREVIOUS SURGERY: Chronic | ICD-10-CM

## 2024-06-24 DIAGNOSIS — O09.523 SUPERVISION OF ELDERLY MULTIGRAVIDA, THIRD TRIMESTER: ICD-10-CM

## 2024-06-24 DIAGNOSIS — Z98.890 OTHER SPECIFIED POSTPROCEDURAL STATES: Chronic | ICD-10-CM

## 2024-06-24 DIAGNOSIS — O26.643 INTRAHEPATIC CHOLESTASIS OF PREGNANCY, THIRD TRIMESTER: ICD-10-CM

## 2024-06-24 LAB
ALBUMIN SERPL ELPH-MCNC: 3.2 G/DL — LOW (ref 3.3–5)
ALP SERPL-CCNC: 214 U/L — HIGH (ref 40–120)
ALT FLD-CCNC: 30 U/L — SIGNIFICANT CHANGE UP (ref 4–33)
ANION GAP SERPL CALC-SCNC: 14 MMOL/L — SIGNIFICANT CHANGE UP (ref 7–14)
APPEARANCE UR: CLEAR — SIGNIFICANT CHANGE UP
AST SERPL-CCNC: 22 U/L — SIGNIFICANT CHANGE UP (ref 4–32)
BACTERIA # UR AUTO: NEGATIVE /HPF — SIGNIFICANT CHANGE UP
BILIRUB SERPL-MCNC: 0.3 MG/DL — SIGNIFICANT CHANGE UP (ref 0.2–1.2)
BILIRUB UR-MCNC: NEGATIVE — SIGNIFICANT CHANGE UP
BLD GP AB SCN SERPL QL: NEGATIVE — SIGNIFICANT CHANGE UP
BUN SERPL-MCNC: 12 MG/DL — SIGNIFICANT CHANGE UP (ref 7–23)
CALCIUM SERPL-MCNC: 8.7 MG/DL — SIGNIFICANT CHANGE UP (ref 8.4–10.5)
CAST: 0 /LPF — SIGNIFICANT CHANGE UP (ref 0–4)
CHLORIDE SERPL-SCNC: 102 MMOL/L — SIGNIFICANT CHANGE UP (ref 98–107)
CO2 SERPL-SCNC: 19 MMOL/L — LOW (ref 22–31)
COLOR SPEC: YELLOW — SIGNIFICANT CHANGE UP
CREAT SERPL-MCNC: 0.69 MG/DL — SIGNIFICANT CHANGE UP (ref 0.5–1.3)
DIFF PNL FLD: NEGATIVE — SIGNIFICANT CHANGE UP
EGFR: 113 ML/MIN/1.73M2 — SIGNIFICANT CHANGE UP
GLUCOSE SERPL-MCNC: 68 MG/DL — LOW (ref 70–99)
GLUCOSE UR QL: NEGATIVE MG/DL — SIGNIFICANT CHANGE UP
GP B STREP DNA SPEC QL NAA+PROBE: NOT DETECTED
HCT VFR BLD CALC: 39.8 % — SIGNIFICANT CHANGE UP (ref 34.5–45)
HGB BLD-MCNC: 13.2 G/DL — SIGNIFICANT CHANGE UP (ref 11.5–15.5)
KETONES UR-MCNC: NEGATIVE MG/DL — SIGNIFICANT CHANGE UP
LEUKOCYTE ESTERASE UR-ACNC: ABNORMAL
MCHC RBC-ENTMCNC: 29.3 PG — SIGNIFICANT CHANGE UP (ref 27–34)
MCHC RBC-ENTMCNC: 33.2 GM/DL — SIGNIFICANT CHANGE UP (ref 32–36)
MCV RBC AUTO: 88.4 FL — SIGNIFICANT CHANGE UP (ref 80–100)
NITRITE UR-MCNC: NEGATIVE — SIGNIFICANT CHANGE UP
NRBC # BLD: 0 /100 WBCS — SIGNIFICANT CHANGE UP (ref 0–0)
NRBC # FLD: 0 K/UL — SIGNIFICANT CHANGE UP (ref 0–0)
PH UR: 6 — SIGNIFICANT CHANGE UP (ref 5–8)
PLATELET # BLD AUTO: 322 K/UL — SIGNIFICANT CHANGE UP (ref 150–400)
POTASSIUM SERPL-MCNC: 3.8 MMOL/L — SIGNIFICANT CHANGE UP (ref 3.5–5.3)
POTASSIUM SERPL-SCNC: 3.8 MMOL/L — SIGNIFICANT CHANGE UP (ref 3.5–5.3)
PROT SERPL-MCNC: 6.1 G/DL — SIGNIFICANT CHANGE UP (ref 6–8.3)
PROT UR-MCNC: NEGATIVE MG/DL — SIGNIFICANT CHANGE UP
RBC # BLD: 4.5 M/UL — SIGNIFICANT CHANGE UP (ref 3.8–5.2)
RBC # FLD: 13.1 % — SIGNIFICANT CHANGE UP (ref 10.3–14.5)
RBC CASTS # UR COMP ASSIST: 1 /HPF — SIGNIFICANT CHANGE UP (ref 0–4)
RH IG SCN BLD-IMP: POSITIVE — SIGNIFICANT CHANGE UP
SODIUM SERPL-SCNC: 135 MMOL/L — SIGNIFICANT CHANGE UP (ref 135–145)
SOURCE GBS: NORMAL
SP GR SPEC: 1.02 — SIGNIFICANT CHANGE UP (ref 1–1.03)
SQUAMOUS # UR AUTO: 2 /HPF — SIGNIFICANT CHANGE UP (ref 0–5)
UROBILINOGEN FLD QL: 1 MG/DL — SIGNIFICANT CHANGE UP (ref 0.2–1)
WBC # BLD: 6.56 K/UL — SIGNIFICANT CHANGE UP (ref 3.8–10.5)
WBC # FLD AUTO: 6.56 K/UL — SIGNIFICANT CHANGE UP (ref 3.8–10.5)
WBC UR QL: 0 /HPF — SIGNIFICANT CHANGE UP (ref 0–5)

## 2024-06-24 PROCEDURE — 76816 OB US FOLLOW-UP PER FETUS: CPT

## 2024-06-24 PROCEDURE — 76818 FETAL BIOPHYS PROFILE W/NST: CPT | Mod: 59

## 2024-06-24 PROCEDURE — 99203 OFFICE O/P NEW LOW 30 MIN: CPT | Mod: 25

## 2024-06-24 RX ORDER — DEXTROSE MONOHYDRATE AND SODIUM CHLORIDE 5; .3 G/100ML; G/100ML
1000 INJECTION, SOLUTION INTRAVENOUS
Refills: 0 | Status: DISCONTINUED | OUTPATIENT
Start: 2024-07-05 | End: 2024-07-05

## 2024-06-24 RX ORDER — TRISODIUM CITRATE DIHYDRATE AND CITRIC ACID MONOHYDRATE 500; 334 MG/5ML; MG/5ML
30 SOLUTION ORAL ONCE
Refills: 0 | Status: DISCONTINUED | OUTPATIENT
Start: 2024-07-05 | End: 2024-07-05

## 2024-06-27 ENCOUNTER — NON-APPOINTMENT (OUTPATIENT)
Age: 39
End: 2024-06-27

## 2024-06-27 ENCOUNTER — APPOINTMENT (OUTPATIENT)
Dept: ANTEPARTUM | Facility: CLINIC | Age: 39
End: 2024-06-27

## 2024-06-27 ENCOUNTER — APPOINTMENT (OUTPATIENT)
Dept: OBGYN | Facility: CLINIC | Age: 39
End: 2024-06-27
Payer: MEDICAID

## 2024-06-27 VITALS
HEART RATE: 78 BPM | WEIGHT: 206 LBS | SYSTOLIC BLOOD PRESSURE: 108 MMHG | DIASTOLIC BLOOD PRESSURE: 73 MMHG | BODY MASS INDEX: 35.17 KG/M2 | HEIGHT: 64 IN

## 2024-06-27 DIAGNOSIS — O09.523 SUPERVISION OF ELDERLY MULTIGRAVIDA, THIRD TRIMESTER: ICD-10-CM

## 2024-06-27 PROCEDURE — 0502F SUBSEQUENT PRENATAL CARE: CPT

## 2024-07-01 ENCOUNTER — ASOB RESULT (OUTPATIENT)
Age: 39
End: 2024-07-01

## 2024-07-01 ENCOUNTER — APPOINTMENT (OUTPATIENT)
Dept: ANTEPARTUM | Facility: CLINIC | Age: 39
End: 2024-07-01
Payer: MEDICAID

## 2024-07-01 PROCEDURE — 76818 FETAL BIOPHYS PROFILE W/NST: CPT

## 2024-07-04 ENCOUNTER — TRANSCRIPTION ENCOUNTER (OUTPATIENT)
Age: 39
End: 2024-07-04

## 2024-07-05 ENCOUNTER — TRANSCRIPTION ENCOUNTER (OUTPATIENT)
Age: 39
End: 2024-07-05

## 2024-07-05 ENCOUNTER — INPATIENT (INPATIENT)
Facility: HOSPITAL | Age: 39
LOS: 2 days | Discharge: HOME | End: 2024-07-08
Attending: STUDENT IN AN ORGANIZED HEALTH CARE EDUCATION/TRAINING PROGRAM | Admitting: STUDENT IN AN ORGANIZED HEALTH CARE EDUCATION/TRAINING PROGRAM
Payer: MEDICAID

## 2024-07-05 ENCOUNTER — RESULT REVIEW (OUTPATIENT)
Age: 39
End: 2024-07-05

## 2024-07-05 ENCOUNTER — APPOINTMENT (OUTPATIENT)
Dept: OBGYN | Facility: HOSPITAL | Age: 39
End: 2024-07-05

## 2024-07-05 VITALS
HEART RATE: 74 BPM | DIASTOLIC BLOOD PRESSURE: 74 MMHG | RESPIRATION RATE: 18 BRPM | SYSTOLIC BLOOD PRESSURE: 120 MMHG | TEMPERATURE: 99 F

## 2024-07-05 DIAGNOSIS — Z98.82 BREAST IMPLANT STATUS: Chronic | ICD-10-CM

## 2024-07-05 DIAGNOSIS — O09.523 SUPERVISION OF ELDERLY MULTIGRAVIDA, THIRD TRIMESTER: ICD-10-CM

## 2024-07-05 DIAGNOSIS — Z98.890 OTHER SPECIFIED POSTPROCEDURAL STATES: Chronic | ICD-10-CM

## 2024-07-05 DIAGNOSIS — Z98.891 HISTORY OF UTERINE SCAR FROM PREVIOUS SURGERY: Chronic | ICD-10-CM

## 2024-07-05 LAB
BASOPHILS # BLD AUTO: 0.03 K/UL — SIGNIFICANT CHANGE UP (ref 0–0.2)
BASOPHILS NFR BLD AUTO: 0.6 % — SIGNIFICANT CHANGE UP (ref 0–2)
BLD GP AB SCN SERPL QL: NEGATIVE — SIGNIFICANT CHANGE UP
EOSINOPHIL # BLD AUTO: 0.15 K/UL — SIGNIFICANT CHANGE UP (ref 0–0.5)
EOSINOPHIL NFR BLD AUTO: 2.8 % — SIGNIFICANT CHANGE UP (ref 0–6)
HCT VFR BLD CALC: 38.7 % — SIGNIFICANT CHANGE UP (ref 34.5–45)
HGB BLD-MCNC: 13.5 G/DL — SIGNIFICANT CHANGE UP (ref 11.5–15.5)
IANC: 3.28 K/UL — SIGNIFICANT CHANGE UP (ref 1.8–7.4)
IMM GRANULOCYTES NFR BLD AUTO: 0.7 % — SIGNIFICANT CHANGE UP (ref 0–0.9)
LYMPHOCYTES # BLD AUTO: 1.39 K/UL — SIGNIFICANT CHANGE UP (ref 1–3.3)
LYMPHOCYTES # BLD AUTO: 25.9 % — SIGNIFICANT CHANGE UP (ref 13–44)
MCHC RBC-ENTMCNC: 30.3 PG — SIGNIFICANT CHANGE UP (ref 27–34)
MCHC RBC-ENTMCNC: 34.9 GM/DL — SIGNIFICANT CHANGE UP (ref 32–36)
MCV RBC AUTO: 86.8 FL — SIGNIFICANT CHANGE UP (ref 80–100)
MONOCYTES # BLD AUTO: 0.47 K/UL — SIGNIFICANT CHANGE UP (ref 0–0.9)
MONOCYTES NFR BLD AUTO: 8.8 % — SIGNIFICANT CHANGE UP (ref 2–14)
NEUTROPHILS # BLD AUTO: 3.28 K/UL — SIGNIFICANT CHANGE UP (ref 1.8–7.4)
NEUTROPHILS NFR BLD AUTO: 61.2 % — SIGNIFICANT CHANGE UP (ref 43–77)
NRBC # BLD: 0 /100 WBCS — SIGNIFICANT CHANGE UP (ref 0–0)
NRBC # FLD: 0 K/UL — SIGNIFICANT CHANGE UP (ref 0–0)
PLATELET # BLD AUTO: 284 K/UL — SIGNIFICANT CHANGE UP (ref 150–400)
RBC # BLD: 4.46 M/UL — SIGNIFICANT CHANGE UP (ref 3.8–5.2)
RBC # FLD: 13 % — SIGNIFICANT CHANGE UP (ref 10.3–14.5)
RH IG SCN BLD-IMP: POSITIVE — SIGNIFICANT CHANGE UP
WBC # BLD: 5.36 K/UL — SIGNIFICANT CHANGE UP (ref 3.8–10.5)
WBC # FLD AUTO: 5.36 K/UL — SIGNIFICANT CHANGE UP (ref 3.8–10.5)

## 2024-07-05 PROCEDURE — 88302 TISSUE EXAM BY PATHOLOGIST: CPT | Mod: 26

## 2024-07-05 PROCEDURE — 59510 CESAREAN DELIVERY: CPT | Mod: U9,GC

## 2024-07-05 PROCEDURE — 58700 REMOVAL OF FALLOPIAN TUBE: CPT | Mod: GC

## 2024-07-05 RX ORDER — DEXTROSE MONOHYDRATE AND SODIUM CHLORIDE 5; .3 G/100ML; G/100ML
1000 INJECTION, SOLUTION INTRAVENOUS
Refills: 0 | Status: DISCONTINUED | OUTPATIENT
Start: 2024-07-05 | End: 2024-07-06

## 2024-07-05 RX ORDER — MORPHINE SULFATE 100 MG/1
0.1 TABLET, EXTENDED RELEASE ORAL ONCE
Refills: 0 | Status: DISCONTINUED | OUTPATIENT
Start: 2024-07-05 | End: 2024-07-07

## 2024-07-05 RX ORDER — DIPHENHYDRAMINE HCL 12.5MG/5ML
25 ELIXIR ORAL EVERY 4 HOURS
Refills: 0 | Status: DISCONTINUED | OUTPATIENT
Start: 2024-07-05 | End: 2024-07-07

## 2024-07-05 RX ORDER — KETOROLAC TROMETHAMINE 30 MG/ML
30 INJECTION, SOLUTION INTRAMUSCULAR EVERY 6 HOURS
Refills: 0 | Status: DISCONTINUED | OUTPATIENT
Start: 2024-07-05 | End: 2024-07-06

## 2024-07-05 RX ORDER — LANOLIN
1 WAX (GRAM) MISCELLANEOUS EVERY 6 HOURS
Refills: 0 | Status: DISCONTINUED | OUTPATIENT
Start: 2024-07-05 | End: 2024-07-08

## 2024-07-05 RX ORDER — FAMOTIDINE 40 MG
20 TABLET ORAL ONCE
Refills: 0 | Status: COMPLETED | OUTPATIENT
Start: 2024-07-05 | End: 2024-07-05

## 2024-07-05 RX ORDER — SIMETHICONE 40MG/0.6ML
80 SUSPENSION, DROPS(FINAL DOSAGE FORM)(ML) ORAL EVERY 4 HOURS
Refills: 0 | Status: DISCONTINUED | OUTPATIENT
Start: 2024-07-05 | End: 2024-07-08

## 2024-07-05 RX ORDER — ACETAMINOPHEN 325 MG
975 TABLET ORAL
Refills: 0 | Status: DISCONTINUED | OUTPATIENT
Start: 2024-07-05 | End: 2024-07-08

## 2024-07-05 RX ORDER — ONDANSETRON HYDROCHLORIDE 2 MG/ML
4 INJECTION INTRAMUSCULAR; INTRAVENOUS EVERY 6 HOURS
Refills: 0 | Status: DISCONTINUED | OUTPATIENT
Start: 2024-07-05 | End: 2024-07-07

## 2024-07-05 RX ORDER — TETANUS TOXOID, REDUCED DIPHTHERIA TOXOID AND ACELLULAR PERTUSSIS VACCINE, ADSORBED 5; 2.5; 8; 8; 2.5 [IU]/.5ML; [IU]/.5ML; UG/.5ML; UG/.5ML; UG/.5ML
0.5 SUSPENSION INTRAMUSCULAR ONCE
Refills: 0 | Status: DISCONTINUED | OUTPATIENT
Start: 2024-07-05 | End: 2024-07-08

## 2024-07-05 RX ORDER — NALBUPHINE HCL 100 %
2.5 POWDER (GRAM) MISCELLANEOUS EVERY 6 HOURS
Refills: 0 | Status: DISCONTINUED | OUTPATIENT
Start: 2024-07-05 | End: 2024-07-07

## 2024-07-05 RX ORDER — OXYCODONE HYDROCHLORIDE 100 MG/5ML
5 SOLUTION ORAL
Refills: 0 | Status: COMPLETED | OUTPATIENT
Start: 2024-07-05 | End: 2024-07-12

## 2024-07-05 RX ORDER — OXYCODONE HYDROCHLORIDE 100 MG/5ML
10 SOLUTION ORAL
Refills: 0 | Status: DISCONTINUED | OUTPATIENT
Start: 2024-07-05 | End: 2024-07-05

## 2024-07-05 RX ORDER — DIPHENHYDRAMINE HCL 12.5MG/5ML
25 ELIXIR ORAL EVERY 6 HOURS
Refills: 0 | Status: DISCONTINUED | OUTPATIENT
Start: 2024-07-05 | End: 2024-07-08

## 2024-07-05 RX ORDER — HEPARIN SODIUM 50 [USP'U]/ML
5000 INJECTION, SOLUTION INTRAVENOUS EVERY 12 HOURS
Refills: 0 | Status: DISCONTINUED | OUTPATIENT
Start: 2024-07-05 | End: 2024-07-08

## 2024-07-05 RX ORDER — OXYCODONE HYDROCHLORIDE 100 MG/5ML
5 SOLUTION ORAL
Refills: 0 | Status: DISCONTINUED | OUTPATIENT
Start: 2024-07-05 | End: 2024-07-05

## 2024-07-05 RX ORDER — OXYTOCIN 30 [USP'U]/500ML
333.33 INJECTION, SOLUTION INTRAVENOUS
Qty: 20 | Refills: 0 | Status: DISCONTINUED | OUTPATIENT
Start: 2024-07-05 | End: 2024-07-05

## 2024-07-05 RX ORDER — OXYTOCIN 30 [USP'U]/500ML
333.33 INJECTION, SOLUTION INTRAVENOUS
Qty: 20 | Refills: 0 | Status: DISCONTINUED | OUTPATIENT
Start: 2024-07-05 | End: 2024-07-06

## 2024-07-05 RX ORDER — TRISODIUM CITRATE DIHYDRATE AND CITRIC ACID MONOHYDRATE 500; 334 MG/5ML; MG/5ML
30 SOLUTION ORAL ONCE
Refills: 0 | Status: COMPLETED | OUTPATIENT
Start: 2024-07-05 | End: 2024-07-05

## 2024-07-05 RX ORDER — DEXTROSE MONOHYDRATE AND SODIUM CHLORIDE 5; .3 G/100ML; G/100ML
1000 INJECTION, SOLUTION INTRAVENOUS
Refills: 0 | Status: DISCONTINUED | OUTPATIENT
Start: 2024-07-05 | End: 2024-07-05

## 2024-07-05 RX ORDER — DEXAMETHASONE 1 MG/1
4 TABLET ORAL EVERY 6 HOURS
Refills: 0 | Status: DISCONTINUED | OUTPATIENT
Start: 2024-07-05 | End: 2024-07-07

## 2024-07-05 RX ORDER — NALOXONE HYDROCHLORIDE 1 MG/ML
0.1 INJECTION PARENTERAL
Refills: 0 | Status: DISCONTINUED | OUTPATIENT
Start: 2024-07-05 | End: 2024-07-07

## 2024-07-05 RX ORDER — OXYCODONE HYDROCHLORIDE 100 MG/5ML
5 SOLUTION ORAL ONCE
Refills: 0 | Status: DISCONTINUED | OUTPATIENT
Start: 2024-07-05 | End: 2024-07-08

## 2024-07-05 RX ADMIN — HEPARIN SODIUM 5000 UNIT(S): 50 INJECTION, SOLUTION INTRAVENOUS at 21:02

## 2024-07-05 RX ADMIN — Medication 1 APPLICATION(S): at 07:33

## 2024-07-05 RX ADMIN — DEXTROSE MONOHYDRATE AND SODIUM CHLORIDE 200 MILLILITER(S): 5; .3 INJECTION, SOLUTION INTRAVENOUS at 07:33

## 2024-07-05 RX ADMIN — Medication 20 MILLIGRAM(S): at 07:34

## 2024-07-05 RX ADMIN — Medication 975 MILLIGRAM(S): at 21:02

## 2024-07-05 RX ADMIN — Medication 25 MILLIGRAM(S): at 21:02

## 2024-07-05 RX ADMIN — Medication 25 MILLIGRAM(S): at 15:24

## 2024-07-05 RX ADMIN — TRISODIUM CITRATE DIHYDRATE AND CITRIC ACID MONOHYDRATE 30 MILLILITER(S): 500; 334 SOLUTION ORAL at 07:34

## 2024-07-05 RX ADMIN — Medication 975 MILLIGRAM(S): at 22:00

## 2024-07-05 NOTE — DISCHARGE NOTE OB - NS MD DC FALL RISK RISK
For information on Fall & Injury Prevention, visit: [email or web address] OR  [email or web address] OR  https://www.cdc.gov/steadi/patient.html

## 2024-07-05 NOTE — OB RN PATIENT PROFILE - NS_ADMITVITALS_OBGYN_ALL_OB_DT
Addison Hawkins is a 31year old male with type 1 diabetes mellitus who returns for a follow up visit. Â   The patient's last office visit with me was on 5/24/2018. Â   HPI: Karoline Alvarado has not been seen in over a year. He is not checking his BG readings at all. He reports occasional hypoglycemia symptoms with no consistent patterns. His last a1c level was 12.1% in March 2018. He is due for labs now. He reports no polyuria or polydipsia. Energy levels are OK. No recent infections. Weight is stable. He reports his diet is poor. Â   He is due for his yearly dilated eye exam. No vision changes. He follows with Psych for depression. Â   Diabetes History:  Type: Type 1  Age at diagnosis: 20   Symptoms at diagnosis: Fatigue, polyuria, polydipsia, weight loss and DKA  Prior hospitalizations for DM: Mild DKA (3/2018)  Â   Current DM medications:  Oral: -  Insulin: Lantus Pen 14-16 units nightly  and Novolog Pen 8 units before meals, correction 1: 50 > 150  Â   Frequency of BG monitoring: None  Frequency of lows: Occasional in mornings   Symptoms of hypoglycemia present: Yes  Â   BG readings: none     History of nephropathy: No  History of CAD: No  History of stroke: No  History of HTN: No  History of dyslipidemia: Yes, not on Rx   History of neuropathy: No  History of PAD: No  History of gastroparesis: No  History of ARUN: No  Â   FH: No family history of DM, thyroid, CAD, or CVA. Â   Â Â   Labs:  Component      Latest Ref Rng & Units 5/31/2017 3/5/2018   GLYCOHEMOGLOBIN A1C      4.2 - 6.0 % 9.0 (H) 12.1 (H)   Est Avg Glucose      0 - 154 mg/dL 213 (H) 301 (H)     Component      Latest Ref Rng & Units 5/31/2017   MICROALBUMIN, URINE      mg/L 10.9   CREATININE, URINE RANDOM      mg/dL 302.7   MICROALBUMIN/CREATININE RATIO      0.0 - 30.0 mg/G 3.6     Component      Latest Ref Rng & Units 5/31/2017   TSH (WITH REFLEX TO FREE T4)      0.30 - 4.82 m[iU]/L 1.09         No past medical history on file.     No past surgical history on file. ALLERGIES: no known allergies. Current Outpatient Medications   Medication Sig Note Last Dose   â¢ insulin aspart (NOVOLOG FLEXPEN) 100 UNIT/ML pen-injector Inject 6 Units into the skin 3 (three) times daily before meals. Plus additional sliding scale up to 40 units daily - Subcutaneous (Patient taking differently: 8 Units. Inject 6 Units into the skin 3 (three) times daily before meals. Plus additional sliding scale up to 40 units daily - Subcutaneous)     â¢ insulin glargine (LANTUS SOLOSTAR, BASAGLAR) 100 UNIT/ML pen-injector Inject 14 Units into the skin nightly. â¢ busPIRone (BUSPAR) 15 MG tablet Take 1 tablet by mouth 2 times daily. â¢ DULoxetine (CYMBALTA) 60 MG capsule Take 1 capsule by mouth daily. â¢ modafinil (PROVIGIL) 100 MG tablet Take 1 tablet by mouth daily. â¢ OLANZapine (ZYPREXA) 5 MG tablet Take 1 tablet by mouth nightly. â¢ Continuous Blood Gluc Sensor ([address] [place] [address]) Misc 1 sensor/patch transdermal every 10 days 2/15/2019: [facility] med transfer: no issue     â¢ Insulin Pen Needle (PEN NEEDLES) 32G X 4 MM Misc To use 4 times daily with Insulin 2/15/2019: [facility] med transfer: no issue       No current facility-administered medications for this visit. No family history on file.     Social History     Socioeconomic History   â¢ Marital status: Single     Spouse name: Not on file   â¢ Number of children: Not on file   â¢ Years of education: Not on file   â¢ Highest education level: Not on file   Occupational History   â¢ Not on file   Social Needs   â¢ Financial resource strain: Not on file   â¢ Food insecurity:     Worry: Not on file     Inability: Not on file   â¢ Transportation needs:     Medical: Not on file     Non-medical: Not on file   Tobacco Use   â¢ Smoking status: Never Smoker   â¢ Smokeless tobacco: Never Used   Substance and Sexual Activity   â¢ Alcohol use: Yes     Comment: rarely   â¢ Drug use: Never   â¢ Sexual activity: Not Currently   Lifestyle "  â¢ Physical activity:     Days per week: Not on file     Minutes per session: Not on file   â¢ Stress: Not on file   Relationships   â¢ Social connections:     Talks on phone: Not on file     Gets together: Not on file     Attends Sikhism service: Not on file     Active member of club or organization: Not on file     Attends meetings of clubs or organizations: Not on file     Relationship status: Not on file   â¢ Intimate partner violence:     Fear of current or ex partner: Not on file     Emotionally abused: Not on file     Physically abused: Not on file     Forced sexual activity: Not on file   Other Topics Concern   â¢ Not on file   Social History Narrative   â¢ Not on file       ROS:  Constitutional: No fevers, chills   Eyes: No blurred vision, other changes in vision  Respiratory: No SOB, cough, wheezing, snoring  CV: No chest pain, palpitations, leg swelling   GI: No abdo pain, change in bowels, nausea/vomiting  : No dysuria, urinary frequency  Skin: No rashes, ulcers, sores, changes in pigmentation  Psych: No depression, anxiety  All other systems reviewed are negative     Vitals: /80   Pulse 85   Ht 5' 8"" (1.727 m)   Wt 56.7 kg (125 lb)   BMI 19.01 kg/mÂ²   BSA 1.67 mÂ²    P/E:  General appearance: healthy, thin and alert  HEENT: PERRLA  Neck: supple  Chest: clear bilaterally  CV: RRR and S1 S2  Abdo:bowel sounds normal, soft and non-tender  Foot exam performed: No.  Extr: no edema  Skin: normal  Neuro: No tremor of outstretched hands, normal reflexes    A/P: Simona Alcantara is a 31year old year-old male with with uncontrolled type 1 DM. Â   Diabetes- Type 1    Since I have no blood sugar readings, it is difficult to offer any recommendations regarding the insulin regimen. Any changes may result in hypoglycemia and may cause more harm than benefit.   We discussed that he is at risk of developing worsening DM complications - including blindness, peripheral neuropathy, worsening renal function, and " cardiovascular disease - leading to heart attacks and strokes. - We discussed that better adherence to insulin therapy and fingersticks is paramount to improved glycemic control.      Â   -Continue Lantus 14-16 units every bedtime  -Cont Novolog 8 units with each meal  -Cont Novolog 1 unit for every 50 > 150 for correction   Â   -Check BG at least 4 times/day    Â    -Also advised to check BG before driving  -Pt advised to call me if BG < 70 or > 250 and not coming down  Â   -We discussed the importance of healthy dietary habits and regular exercise   Â   -Pt advised to update me with BG readings within 4 weeks  Â   -Check A1c level now    Lipids  -Not on Rx   -Advised to eat a low-fat diet and to exercise regularly  -Check lipids and CMP  Â   Ophtho- No retinopathy   -Annual dilated eye exam due now  Â   Renal, no proteinuria  -Check urine microalbumin now  -Monitor Cr/GFR  Â   Depression  -Cont f/u with Psychiatry  Â   RTC in 3-4 months  My collaborating Physician is Dr. Kamari Wang    Electronically signed by: Georgie Boston, CNP 07/10/19 05-Jul-2024 06:40

## 2024-07-05 NOTE — OB PROVIDER DELIVERY SUMMARY - NSPROVIDERDELIVERYNOTE_OBGYN_ALL_OB_FT
rLTCS+BS@38w5d, hx CSx2, hernia repair x2   Viable male infant, apgars 9/9 , 3000g, vertex presentation  Significant adhesive disease in the subcutaneous layer superior to the fascia. No significant intraabdominal adhesive disease.  Grossly normal uterus, tubes, ovaries. Bilateral salpingectomy performed with Ligasure.   Hysterotomy closed in a single layer with caprosyn.   Good hemostasis noted.    QBL: 307  IVF: 1200  UOP: 300    Dictation# rLTCS+BS@38w5d, hx CSx2, hernia repair x2   Viable male infant, apgars 9/9 , 3000g, vertex presentation  Significant adhesive disease in the subcutaneous layer superior to the fascia. No significant intraabdominal adhesive disease.  Grossly normal uterus, tubes, ovaries. Bilateral salpingectomy performed with Ligasure.   Hysterotomy closed in a single layer with caprosyn.   Good hemostasis noted.    QBL: 307  IVF: 1200  UOP: 300    Dictation#[id number]

## 2024-07-05 NOTE — DISCHARGE NOTE OB - MEDICATION SUMMARY - MEDICATIONS TO STOP TAKING
I will STOP taking the medications listed below when I get home from the hospital:    aspirin 81 mg oral tablet  -- 1 tab(s) by mouth every other day    aspirin 81 mg oral tablet  -- 2 tab(s) by mouth every other day    ursodiol 300 mg oral capsule  -- 1 cap(s) by mouth 3 times a day

## 2024-07-05 NOTE — OB PROVIDER H&P - NSHPPHYSICALEXAM_GEN_ALL_CORE
Vital Signs Last 24 Hrs  T(C): --  T(F): --  HR: 74 (05 Jul 2024 06:26) (74 - 74)  BP: 120/74 (05 Jul 2024 06:26) (120/74 - 120/74)  BP(mean): --  RR: --  SpO2: --      FHT: Baseline: 130 , moderate variability, + accels, - decels  [place]: q irr  Sono: Vertex

## 2024-07-05 NOTE — OB PROVIDER H&P - ATTENDING COMMENTS
P2 at 38+ weeks for repeat   ICP in pregnancy, [facility] recommends delivery between 38-39 weeks  Multiple abdominal surgery, including  x2, hernia repair x2 via vertical midline incision (one with mesh)  For bilateral salpingectomy as well  Risks reviewed with patient, consent signed and witnessed    Charles Barron MD

## 2024-07-05 NOTE — DISCHARGE NOTE OB - MEDICATION SUMMARY - MEDICATIONS TO TAKE
I will START or STAY ON the medications listed below when I get home from the hospital:    ibuprofen 600 mg oral tablet  -- 1 tab(s) by mouth every 6 hours  -- Indication: For Pain   I will START or STAY ON the medications listed below when I get home from the hospital:    ibuprofen 600 mg oral tablet  -- 1 tab(s) by mouth every 6 hours as needed for  moderate pain  -- Indication: For Pain    acetaminophen 500 mg oral tablet  -- 2 tab(s) by mouth every 6 hours as needed for  mild pain  -- Indication: For Pain    multivitamin, prenatal  -- 1 tab(s) by mouth once a day  -- Indication: For Vitamin

## 2024-07-05 NOTE — OB PROVIDER H&P - HISTORY OF PRESENT ILLNESS
HPI: Pt is a 38y   @38w5d presenting for rLTCS+BS. She reports feeling fetal movement and contractions for the past 1wk q2h 8/10 in pain. She denies vaginal bleeding and contractions. PNC c/b ICP.  FM (+)  LOF (-)  CTX (-)  VB (-)  GBS neg  EFW: 2900g    OBHx:  pLTCS 9#,  6# c/b A2, med TOP   GynHx: Denies uterine polyps, ovarian cysts, no abnml paps or STI/STDs. fibroids reports removed during first  section  PMHx: Denies  PSHx: Abdominoplasty, umbilical hernia x2, breast surgery, reports one more abdominal surgery  Med: PNV, ursodiol, bASA, PNV  All: NKDA  Psych: Anxiety, sees therapist  SH: Denies hx of smoking, drinking, or drug usage during the pregnancy

## 2024-07-05 NOTE — OB RN INTRAOPERATIVE NOTE - NS_ELECTROPADLOC_OBGYN_ALL_OB
Semaj Villatoro is calling to request a refill on the following medication(s):    Medication Request:  Requested Prescriptions     Pending Prescriptions Disp Refills    omeprazole (PRILOSEC) 40 MG delayed release capsule [Pharmacy Med Name: OMEPRAZOLE DR CAPS 40MG] 90 capsule 0     Sig: TAKE 1 CAPSULE DAILY    atorvastatin (LIPITOR) 40 MG tablet [Pharmacy Med Name: ATORVASTATIN TABS 40MG] 90 tablet 0     Sig: TAKE 1 TABLET DAILY    montelukast (SINGULAIR) 10 MG tablet [Pharmacy Med Name: MONTELUKAST SODIUM TABS 10MG] 90 tablet 0     Sig: TAKE 1 TABLET DAILY    amLODIPine (NORVASC) 10 MG tablet [Pharmacy Med Name: AMLODIPINE BESYLATE TABS 10MG] 90 tablet 0     Sig: TAKE 1 TABLET DAILY     Last fill 1/7/22  Last Visit Date (If Applicable):  1/53/3372    Next Visit Date:    9/22/2022
Left thigh

## 2024-07-05 NOTE — PROCEDURAL SAFETY CHECKLIST WITH OR WITHOUT SEDATION - NSPXCONFIRMCONSENT_GEN_ALL_CORE
done Detail Level: Simple Size Of Lesion: 0.9cm Instructions (Optional): Patient instructed to schedule an excision with Lanai Cooksey PA-C, Amaury White PA-C or Dr. Hopson

## 2024-07-05 NOTE — DISCHARGE NOTE OB - PATIENT PORTAL LINK FT
You can access the FollowMyHealth Patient Portal offered by [facility] by registering at the following website: [email or web address]. By joining [facility]’s FollowMyHealth portal, you will also be able to view your health information using other applications (apps) compatible with our system.

## 2024-07-05 NOTE — OB RN DELIVERY SUMMARY - NS_FORCEPSATTEMPT_OBGYN_ALL_OB
What Type Of Note Output Would You Prefer (Optional)?: Standard Output How Severe Is Your Skin Lesion?: moderate Has Your Skin Lesion Been Treated?: not been treated Is This A New Presentation, Or A Follow-Up?: Growth How Severe Is Your Skin Lesion?: mild Forceps were not used

## 2024-07-05 NOTE — DISCHARGE NOTE OB - CARE PROVIDER_API CALL
Charles Barron  Obstetrics and Gynecology  [address], [address]  [place] [address]  Phone: [phone]  Fax: [phone]  Follow Up Time:

## 2024-07-05 NOTE — OB RN DELIVERY SUMMARY - NSSELHIDDEN_OBGYN_ALL_OB_FT
[NS_DeliveryAttending1_OBGYN_ALL_OB_FT:[email or web address]=],[NS_DeliveryAssist1_OBGYN_ALL_OB_FT:[email or web address]=],[NS_DeliveryRN_OBGYN_ALL_OB_FT:[id number]=]

## 2024-07-05 NOTE — OB RN INTRAOPERATIVE NOTE - NS_PACKS_OBGYN_ALL_OB
Contact Information: If you have any questions, concerns, pended studies, tests and/or procedures, or emergencies regarding your inpatient behavioral health visit  Please contact [address] [place] [address] # [phone] and ask to speak to a , nurse or physician  A contact is available 24 hours/ 7 days a week at this number  Summary of Procedures Performed During your Stay:  Below is a list of major procedures performed during your hospital stay and a summary of results:  - No major procedures performed  Pending Studies (From admission, onward)     Start     Ordered    07/09/23 0600  TSH, 3rd generation with Free T4 reflex  Morning draw         05/27/23 1706              Please follow up on the above pending studies with your PCP and/or referring provider  None

## 2024-07-05 NOTE — DISCHARGE NOTE OB - CARE PLAN
1 Principal Discharge DX:	S/P repeat low transverse   Assessment and plan of treatment:	Return to normal activities of daily living   Principal Discharge DX:	S/P repeat low transverse   Assessment and plan of treatment:	After discharge, please stay on pelvic rest for 6 weeks, meaning no sexual intercourse, no tampons and no douching.  No driving for 2 weeks as women can loose a lot of blood during delivery and there is a possibility of being lightheaded/fainting.  No lifting objects heavier than baby for two weeks.  Expect to have vaginal bleeding/spotting for up to six weeks.  The bleeding should get lighter and more white/light brown with time.  For bleeding soaking more than a pad an hour or passing clots greater than the size of your fist, come in to the emergency department.    Follow up in OB office in 1-2 weeks for incision check.  Call for noticeable increase in redness or swelling at incision, discharge from incision, or opening of skin at incision site

## 2024-07-05 NOTE — DISCHARGE NOTE OB - HOSPITAL COURSE
Patient admitted for repeat , had uncomplicated delivery, live male infant, postpartum course was uncomplicated

## 2024-07-05 NOTE — DISCHARGE NOTE OB - NS DC ANGIO PCI YN
Chief complaint: Shortness of breath      HPI:  6/10/2021, Time: 1600    HPI               Deneen Garcia is a 57 y.o. female presenting to the ED for shortness of breath. The history is obtained from the patient as well as the patient's medical record. The patient is presenting today for shortness of breath. The patient states this has been present for 1 month but has gradually worsened. This is moderate in severity. Worse with activity and exertion. Mildly relieved with rest.  No treatment prior to arrival.  This been constant since onset. The patient states that she is concerned as she does have a history of pulmonary embolus in the past.  She denies any associated fevers, chills, chest pain, nausea, vomiting, abdominal pain, dysuria, diarrhea or constipation. ROS:   Review of Systems   Constitutional: Negative for chills and fatigue. HENT: Negative for congestion. Eyes: Negative for redness. Respiratory: Positive for shortness of breath. Cardiovascular: Negative for chest pain. Gastrointestinal: Negative for abdominal pain, nausea and vomiting. Genitourinary: Negative for dysuria. Musculoskeletal: Negative for arthralgias. Skin: Negative for rash. Neurological: Negative for light-headedness. Psychiatric/Behavioral: Negative for confusion. All other systems reviewed and are negative.      --------------------------------------------- PAST HISTORY ---------------------------------------------  Past Medical History:  has a past medical history of Deep vein thrombosis (DVT) of popliteal vein of right lower extremity ([address] Hx of blood clots, Hyperlipidemia, Nausea & vomiting, Pneumonia, PONV (postoperative nausea and vomiting), Pulmonary embolism, bilateral ([address] and SOB (shortness of breath). Past Surgical History:  has a past surgical history that includes Tonsillectomy; eye surgery; Toe Surgery; laparoscopy; Colonoscopy (5/16/13);  Upper gastrointestinal endoscopy (N/A, 3/12/2020); Colonoscopy (N/A, 3/12/2020); Upper gastrointestinal endoscopy (N/A, 6/3/2021); and Endoscopy, colon, diagnostic. Social History:  reports that she has never smoked. She has never used smokeless tobacco. She reports that she does not drink alcohol and does not use drugs. Family History: family history includes Cancer in her father and mother; Heart Disease in her father, maternal aunt, maternal uncle, paternal aunt, and paternal uncle; High Blood Pressure in her father. The patients home medications have been reviewed. Allergies: Amoxicillin    ---------------------------------------------------PHYSICAL EXAM--------------------------------------  Constitutional/General: Alert and oriented x3, well appearing, non toxic in NAD  Head: Normocephalic and atraumatic  Mouth: Oropharynx clear, handling secretions, no trismus  Neck: Supple, full ROM,  Pulmonary: Lungs clear to auscultation bilaterally, no wheezes, rales, or rhonchi. Not in respiratory distress  Cardiovascular:  Regular rate. Regular rhythm. No murmurs  Chest: no chest wall tenderness  Abdomen: Soft. Non tender. Non distended. No rebound, guarding, or rigidity. No pulsatile masses appreciated. Musculoskeletal: Moves all extremities x 4. Warm and well perfused, no clubbing, cyanosis, or edema. Capillary refill <3 seconds  Skin: warm and dry. No rashes. Neurologic: GCS 15, no gross focal neurologic deficits  Psych: Normal Affect    -------------------------------------------------- RESULTS -------------------------------------------------  I have personally reviewed all laboratory and imaging results for this patient. Results are listed below.      LABS:  Results for orders placed or performed during the hospital encounter of 06/10/21   COVID-19, Rapid    Specimen: Nasopharyngeal Swab   Result Value Ref Range    SARS-CoV-2, NAAT Not Detected Not Detected   CBC Auto Differential   Result Value Ref Range    WBC 7.0 4.5 - 11.5 E9/L RBC 4.58 3.50 - 5.50 E12/L    Hemoglobin 13.7 11.5 - 15.5 g/dL    Hematocrit 42.0 34.0 - 48.0 %    MCV 91.7 80.0 - 99.9 fL    MCH 29.9 26.0 - 35.0 pg    MCHC 32.6 32.0 - 34.5 %    RDW 13.2 11.5 - 15.0 fL    Platelets 266 723 - 596 E9/L    MPV 10.6 7.0 - 12.0 fL    Neutrophils % 62.6 43.0 - 80.0 %    Immature Granulocytes % 0.1 0.0 - 5.0 %    Lymphocytes % 26.8 20.0 - 42.0 %    Monocytes % 7.9 2.0 - 12.0 %    Eosinophils % 2.2 0.0 - 6.0 %    Basophils % 0.4 0.0 - 2.0 %    Neutrophils Absolute 4.35 1.80 - 7.30 E9/L    Immature Granulocytes # 0.01 E9/L    Lymphocytes Absolute 1.86 1.50 - 4.00 E9/L    Monocytes Absolute 0.55 0.10 - 0.95 E9/L    Eosinophils Absolute 0.15 0.05 - 0.50 E9/L    Basophils Absolute 0.03 0.00 - 0.20 E9/L   Comprehensive Metabolic Panel w/ Reflex to MG   Result Value Ref Range    Sodium 143 132 - 146 mmol/L    Potassium reflex Magnesium 4.0 3.5 - 5.0 mmol/L    Chloride 108 (H) 98 - 107 mmol/L    CO2 21 (L) 22 - 29 mmol/L    Anion Gap 14 7 - 16 mmol/L    Glucose 103 (H) 74 - 99 mg/dL    BUN 10 6 - 23 mg/dL    CREATININE 0.9 0.5 - 1.0 mg/dL    GFR Non-African American >60 >=60 mL/min/1.73    GFR African American >60     Calcium 10.2 8.6 - 10.2 mg/dL    Total Protein 7.4 6.4 - 8.3 g/dL    Albumin 4.4 3.5 - 5.2 g/dL    Total Bilirubin 0.4 0.0 - 1.2 mg/dL    Alkaline Phosphatase 123 (H) 35 - 104 U/L    ALT 13 0 - 32 U/L    AST 15 0 - 31 U/L   Troponin   Result Value Ref Range    Troponin, High Sensitivity <6 0 - 9 ng/L   Brain Natriuretic Peptide   Result Value Ref Range    Pro- (H) 0 - 125 pg/mL   Protime-INR   Result Value Ref Range    Protime 12.7 (H) 9.3 - 12.4 sec    INR 1.1    APTT   Result Value Ref Range    aPTT 27.9 24.5 - 35.1 sec   D-Dimer, Quantitative   Result Value Ref Range    D-Dimer, Quant 617 ng/mL DDU   CBC WITH AUTO DIFFERENTIAL   Result Value Ref Range    WBC 7.2 4.5 - 11.5 E9/L    RBC 4.26 3.50 - 5.50 E12/L    Hemoglobin 12.8 11.5 - 15.5 g/dL    Hematocrit 39.5 34.0 - 48.0 %    MCV 92.7 80.0 - 99.9 fL    MCH 30.0 26.0 - 35.0 pg    MCHC 32.4 32.0 - 34.5 %    RDW 13.2 11.5 - 15.0 fL    Platelets 111 853 - 196 E9/L    MPV 10.9 7.0 - 12.0 fL    Neutrophils % 63.1 43.0 - 80.0 %    Immature Granulocytes % 0.3 0.0 - 5.0 %    Lymphocytes % 24.5 20.0 - 42.0 %    Monocytes % 10.0 2.0 - 12.0 %    Eosinophils % 1.7 0.0 - 6.0 %    Basophils % 0.4 0.0 - 2.0 %    Neutrophils Absolute 4.52 1.80 - 7.30 E9/L    Immature Granulocytes # 0.02 E9/L    Lymphocytes Absolute 1.76 1.50 - 4.00 E9/L    Monocytes Absolute 0.72 0.10 - 0.95 E9/L    Eosinophils Absolute 0.12 0.05 - 0.50 E9/L    Basophils Absolute 0.03 0.00 - 0.20 E9/L   CBC WITH AUTO DIFFERENTIAL   Result Value Ref Range    WBC 7.0 4.5 - 11.5 E9/L    RBC 4.21 3.50 - 5.50 E12/L    Hemoglobin 12.8 11.5 - 15.5 g/dL    Hematocrit 39.2 34.0 - 48.0 %    MCV 93.1 80.0 - 99.9 fL    MCH 30.4 26.0 - 35.0 pg    MCHC 32.7 32.0 - 34.5 %    RDW 13.3 11.5 - 15.0 fL    Platelets 344 151 - 266 E9/L    MPV 11.1 7.0 - 12.0 fL    Neutrophils % 58.5 43.0 - 80.0 %    Immature Granulocytes % 0.3 0.0 - 5.0 %    Lymphocytes % 27.2 20.0 - 42.0 %    Monocytes % 8.7 2.0 - 12.0 %    Eosinophils % 4.9 0.0 - 6.0 %    Basophils % 0.4 0.0 - 2.0 %    Neutrophils Absolute 4.10 1.80 - 7.30 E9/L    Immature Granulocytes # 0.02 E9/L    Lymphocytes Absolute 1.91 1.50 - 4.00 E9/L    Monocytes Absolute 0.61 0.10 - 0.95 E9/L    Eosinophils Absolute 0.34 0.05 - 0.50 E9/L    Basophils Absolute 0.03 0.00 - 0.20 E9/L   EKG 12 Lead   Result Value Ref Range    Ventricular Rate 92 BPM    Atrial Rate 92 BPM    P-R Interval 160 ms    QRS Duration 80 ms    Q-T Interval 368 ms    QTc Calculation (Bazett) 455 ms    P Axis 69 degrees    R Axis 25 degrees    T Axis 47 degrees       RADIOLOGY:  Interpreted by Radiologist.  CTA CHEST W CONTRAST   Final Result   Multiple bilateral pulmonary emboli to the right upper lobe, right lower   lobe, right middle lobe, left upper lobe, and left lower lobe. Near   occlusive emboli to the left upper lobe and right lower lobe. No evidence of   RV strain. Nonspecific pretracheal retrocaval node measuring approximately 1.8 cm in   diameter. No airspace disease or pleural effusions. Findings reported to Dr. Marie Guzman at 8:59 p.m. XR CHEST (2 VW)   Final Result   Atherosclerotic disease. No additional evidence of active cardiopulmonary   pathology. EKG:  This EKG is signed and interpreted by me. Normal sinus rhythm, rate of 92, no ST segment elevation or depression, MN interval 160 MS, QRS 80 MS,  MS, there are T wave inversions in leads V3 and V4  Interpreted by me      ------------------------- NURSING NOTES AND VITALS REVIEWED ---------------------------   The nursing notes within the ED encounter and vital signs as below have been reviewed by myself. /64   Pulse 92   Temp 97.9 °F (36.6 °C) (Oral)   Resp 18   Ht 5' 5\" (1.651 m)   Wt 193 lb (87.5 kg)   LMP 08/15/2010 (Exact Date)   SpO2 96%   BMI 32.12 kg/m²   Oxygen Saturation Interpretation: Normal    The patients available past medical records and past encounters were reviewed.         ------------------------------ ED COURSE/MEDICAL DECISION MAKING----------------------  Medications   apixaban (ELIQUIS) tablet 10 mg (10 mg Oral Given 6/12/21 0909)   ipratropium-albuterol (DUONEB) nebulizer solution 1 ampule (1 ampule Inhalation Given 6/12/21 1000)   albuterol (PROVENTIL) nebulizer solution 2.5 mg (has no administration in time range)   therapeutic multivitamin-minerals 1 tablet (1 tablet Oral Given 6/11/21 1738)   pantoprazole (PROTONIX) tablet 40 mg (40 mg Oral Given 5/53/68 9656)   folic acid (FOLVITE) tablet 1 mg (1 mg Oral Given 6/11/21 1738)   aspirin EC tablet 81 mg (81 mg Oral Given 6/11/21 5223)   perflutren lipid microspheres (DEFINITY) injection 1.65 mg (has no administration in time range)   iopamidol (ISOVUE-370) 76 % injection 75 mL (75 mLs Intravenous Given 6/10/21 1940)   enoxaparin (LOVENOX) injection 80 mg (80 mg Subcutaneous Given 6/10/21 2148)             Medical Decision Making:   I, Dr. Isaac Saenz am the primary physician of record. Tamara Young is a 57 y.o. female who presents to the ED for shortness of breath differential diagnosis includes but is not limited to pneumonia, pulmonary embolus, pneumothorax the patient does have a past medical history of   has a past medical history of Deep vein thrombosis (DVT) of popliteal vein of right lower extremity ([address] Hx of blood clots, Hyperlipidemia, Nausea & vomiting, Pneumonia, PONV (postoperative nausea and vomiting), Pulmonary embolism, bilateral ([address] and SOB (shortness of breath). . The patient was given Lovenox. Labs and imaging obtained, reviewed. Patient treated symptomatically. The patient did have a CBC, BMP and troponin which were unremarkable, D-dimer elevated so CTA of the chest was ordered did demonstrate multiple bilateral pulmonary emboli. The patient is given Lovenox. There is no evidence of right heart strain. Internal medicine consultation, pulmonology consultation. The patient will be admitted for further treatment and evaluation. Re-Evaluations/Consultations:           Patient is in the bed in no acute distress. The results of today were discussed. Spoke with Dr. Moose Diez. He will accept the patient for admission. He would like pulmonology consult placed. Spoke with Banner NP for pulmonology. He will provide consultation. This patient's ED course included: History, physical examination, reevaluation prior to disposition, labs, imaging, telemetry monitoring, EKG, Lovenox      This patient has remained hemodynamically stable during their ED course.     Critical care:  Critical Care: Please note that the withdrawal or failure to initiate urgent interventions for this patient would likely result in a life threatening deterioration or permanent no

## 2024-07-05 NOTE — OB PROVIDER DELIVERY SUMMARY - NSPPHRISKSTATUS_OBGYN_ALL_OB
CDC VIS provided to and discussed with caregiver including risks and benefits of vaccines to be administered at today's visit (see vaccines below), reviewed signs and symptoms of vaccine reactions and when to call clinic.   
Growing and developing well  Age appropriate anticipatory guidance regarding growth, development, nutrition, vaccination, and safety discussed and handout given to caregiver.   
High Risk

## 2024-07-05 NOTE — OB PROVIDER H&P - ASSESSMENT
A/P: Pt is a 38y  who presents for rLTCS+BS    1. Admit to L&D. Routine Labs. IVF  2. rLTCS+BS  3. Fetus: Vertex, Reactive/CEFM  4. GBS neg  5. Pain: plan for spinal     Discussed with Dr. Anderson Jackson MD, PGY-2  Obstetrics and Gynecology

## 2024-07-05 NOTE — OB RN DELIVERY SUMMARY - BABYS CARE PROVIDER NAME, OB PROFILE
[FreeTextEntry1] : 35 year old man seen 09/01/2023 presenting for infertility screening. He and his wife, who is also 37 yo, have been trying to conceive for 8 months. Just recently using timed coitus and ovulation urine tests. No previous pregnancies for either partner. No congenital abnormalities, no orchitis, no STIs, no trauma, no chemo, no radiation history. No family history of syndromes. Good libidio. He does report some mild discomfort at his foreskin on occasion. 
Manjit

## 2024-07-05 NOTE — DISCHARGE NOTE OB - PLAN OF CARE
Return to normal activities of daily living After discharge, please stay on pelvic rest for 6 weeks, meaning no sexual intercourse, no tampons and no douching.  No driving for 2 weeks as women can loose a lot of blood during delivery and there is a possibility of being lightheaded/fainting.  No lifting objects heavier than baby for two weeks.  Expect to have vaginal bleeding/spotting for up to six weeks.  The bleeding should get lighter and more white/light brown with time.  For bleeding soaking more than a pad an hour or passing clots greater than the size of your fist, come in to the emergency department.    Follow up in OB office in 1-2 weeks for incision check.  Call for noticeable increase in redness or swelling at incision, discharge from incision, or opening of skin at incision site

## 2024-07-05 NOTE — OB RN INTRAOPERATIVE NOTE - NSSELHIDDEN_OBGYN_ALL_OB_FT
[[email or web address]_OB_FT:[email or web address]=],[NS_DeliveryAssist1_OBGYN_ALL_OB_FT:[email or web address]=],[NS_DeliveryRN_OBGYN_ALL_OB_FT:[id number]=]

## 2024-07-05 NOTE — OB PROVIDER DELIVERY SUMMARY - NSSELHIDDEN_OBGYN_ALL_OB_FT
[NS_[email or web address]_ALL_OB_FT:[email or web address]=],[NS_DeliveryAssist1_OBGYN_ALL_OB_FT:[email or web address]=],[NS_DeliveryRN_OBGYN_ALL_OB_FT:[id number]=]

## 2024-07-05 NOTE — OB RN PATIENT PROFILE - FALL HARM RISK - UNIVERSAL INTERVENTIONS
Bed in lowest position, wheels locked, appropriate side rails in place/Call bell, personal items and telephone in reach/Instruct patient to call for assistance before getting out of bed or chair/Non-slip footwear when patient is out of bed/Export to call system/Physically safe environment - no spills, clutter or unnecessary equipment/Purposeful Proactive Rounding/Room/bathroom lighting operational, light cord in reach

## 2024-07-06 LAB
BASOPHILS # BLD AUTO: 0.03 K/UL — SIGNIFICANT CHANGE UP (ref 0–0.2)
BASOPHILS NFR BLD AUTO: 0.4 % — SIGNIFICANT CHANGE UP (ref 0–2)
EOSINOPHIL # BLD AUTO: 0.08 K/UL — SIGNIFICANT CHANGE UP (ref 0–0.5)
EOSINOPHIL NFR BLD AUTO: 1 % — SIGNIFICANT CHANGE UP (ref 0–6)
HCT VFR BLD CALC: 35.7 % — SIGNIFICANT CHANGE UP (ref 34.5–45)
HGB BLD-MCNC: 11.9 G/DL — SIGNIFICANT CHANGE UP (ref 11.5–15.5)
IANC: 5.1 K/UL — SIGNIFICANT CHANGE UP (ref 1.8–7.4)
IMM GRANULOCYTES NFR BLD AUTO: 0.5 % — SIGNIFICANT CHANGE UP (ref 0–0.9)
LYMPHOCYTES # BLD AUTO: 1.84 K/UL — SIGNIFICANT CHANGE UP (ref 1–3.3)
LYMPHOCYTES # BLD AUTO: 24 % — SIGNIFICANT CHANGE UP (ref 13–44)
MCHC RBC-ENTMCNC: 29.8 PG — SIGNIFICANT CHANGE UP (ref 27–34)
MCHC RBC-ENTMCNC: 33.3 GM/DL — SIGNIFICANT CHANGE UP (ref 32–36)
MCV RBC AUTO: 89.3 FL — SIGNIFICANT CHANGE UP (ref 80–100)
MONOCYTES # BLD AUTO: 0.58 K/UL — SIGNIFICANT CHANGE UP (ref 0–0.9)
MONOCYTES NFR BLD AUTO: 7.6 % — SIGNIFICANT CHANGE UP (ref 2–14)
NEUTROPHILS # BLD AUTO: 5.1 K/UL — SIGNIFICANT CHANGE UP (ref 1.8–7.4)
NEUTROPHILS NFR BLD AUTO: 66.5 % — SIGNIFICANT CHANGE UP (ref 43–77)
NRBC # BLD: 0 /100 WBCS — SIGNIFICANT CHANGE UP (ref 0–0)
NRBC # FLD: 0 K/UL — SIGNIFICANT CHANGE UP (ref 0–0)
PLATELET # BLD AUTO: 262 K/UL — SIGNIFICANT CHANGE UP (ref 150–400)
RBC # BLD: 4 M/UL — SIGNIFICANT CHANGE UP (ref 3.8–5.2)
RBC # FLD: 13.1 % — SIGNIFICANT CHANGE UP (ref 10.3–14.5)
WBC # BLD: 7.67 K/UL — SIGNIFICANT CHANGE UP (ref 3.8–10.5)
WBC # FLD AUTO: 7.67 K/UL — SIGNIFICANT CHANGE UP (ref 3.8–10.5)

## 2024-07-06 RX ADMIN — KETOROLAC TROMETHAMINE 30 MILLIGRAM(S): 30 INJECTION, SOLUTION INTRAMUSCULAR at 13:11

## 2024-07-06 RX ADMIN — KETOROLAC TROMETHAMINE 30 MILLIGRAM(S): 30 INJECTION, SOLUTION INTRAMUSCULAR at 00:45

## 2024-07-06 RX ADMIN — Medication 30 MILLILITER(S): at 18:30

## 2024-07-06 RX ADMIN — Medication 600 MILLIGRAM(S): at 23:42

## 2024-07-06 RX ADMIN — Medication 975 MILLIGRAM(S): at 08:55

## 2024-07-06 RX ADMIN — KETOROLAC TROMETHAMINE 30 MILLIGRAM(S): 30 INJECTION, SOLUTION INTRAMUSCULAR at 00:23

## 2024-07-06 RX ADMIN — Medication 600 MILLIGRAM(S): at 19:10

## 2024-07-06 RX ADMIN — Medication 975 MILLIGRAM(S): at 20:55

## 2024-07-06 RX ADMIN — HEPARIN SODIUM 5000 UNIT(S): 50 INJECTION, SOLUTION INTRAVENOUS at 08:54

## 2024-07-06 RX ADMIN — Medication 25 MILLIGRAM(S): at 06:08

## 2024-07-06 RX ADMIN — Medication 975 MILLIGRAM(S): at 03:30

## 2024-07-06 RX ADMIN — Medication 975 MILLIGRAM(S): at 16:25

## 2024-07-06 RX ADMIN — Medication 975 MILLIGRAM(S): at 15:55

## 2024-07-06 RX ADMIN — KETOROLAC TROMETHAMINE 30 MILLIGRAM(S): 30 INJECTION, SOLUTION INTRAMUSCULAR at 12:41

## 2024-07-06 RX ADMIN — Medication 600 MILLIGRAM(S): at 18:40

## 2024-07-06 RX ADMIN — KETOROLAC TROMETHAMINE 30 MILLIGRAM(S): 30 INJECTION, SOLUTION INTRAMUSCULAR at 06:36

## 2024-07-06 RX ADMIN — Medication 975 MILLIGRAM(S): at 09:25

## 2024-07-06 RX ADMIN — HEPARIN SODIUM 5000 UNIT(S): 50 INJECTION, SOLUTION INTRAVENOUS at 20:55

## 2024-07-06 RX ADMIN — Medication 975 MILLIGRAM(S): at 02:34

## 2024-07-06 RX ADMIN — Medication 975 MILLIGRAM(S): at 21:30

## 2024-07-06 RX ADMIN — KETOROLAC TROMETHAMINE 30 MILLIGRAM(S): 30 INJECTION, SOLUTION INTRAMUSCULAR at 06:09

## 2024-07-07 RX ORDER — OXYCODONE HYDROCHLORIDE 100 MG/5ML
5 SOLUTION ORAL
Refills: 0 | Status: DISCONTINUED | OUTPATIENT
Start: 2024-07-07 | End: 2024-07-08

## 2024-07-07 RX ADMIN — OXYCODONE HYDROCHLORIDE 5 MILLIGRAM(S): 100 SOLUTION ORAL at 23:01

## 2024-07-07 RX ADMIN — Medication 600 MILLIGRAM(S): at 06:00

## 2024-07-07 RX ADMIN — Medication 975 MILLIGRAM(S): at 15:44

## 2024-07-07 RX ADMIN — Medication 600 MILLIGRAM(S): at 19:09

## 2024-07-07 RX ADMIN — Medication 600 MILLIGRAM(S): at 00:15

## 2024-07-07 RX ADMIN — Medication 975 MILLIGRAM(S): at 09:57

## 2024-07-07 RX ADMIN — Medication 975 MILLIGRAM(S): at 20:42

## 2024-07-07 RX ADMIN — Medication 600 MILLIGRAM(S): at 18:38

## 2024-07-07 RX ADMIN — Medication 975 MILLIGRAM(S): at 16:18

## 2024-07-07 RX ADMIN — Medication 600 MILLIGRAM(S): at 14:02

## 2024-07-07 RX ADMIN — Medication 600 MILLIGRAM(S): at 05:33

## 2024-07-07 RX ADMIN — OXYCODONE HYDROCHLORIDE 5 MILLIGRAM(S): 100 SOLUTION ORAL at 09:57

## 2024-07-07 RX ADMIN — HEPARIN SODIUM 5000 UNIT(S): 50 INJECTION, SOLUTION INTRAVENOUS at 09:19

## 2024-07-07 RX ADMIN — Medication 975 MILLIGRAM(S): at 21:20

## 2024-07-07 RX ADMIN — Medication 600 MILLIGRAM(S): at 13:31

## 2024-07-07 RX ADMIN — OXYCODONE HYDROCHLORIDE 5 MILLIGRAM(S): 100 SOLUTION ORAL at 10:30

## 2024-07-07 RX ADMIN — OXYCODONE HYDROCHLORIDE 5 MILLIGRAM(S): 100 SOLUTION ORAL at 22:30

## 2024-07-07 RX ADMIN — Medication 975 MILLIGRAM(S): at 09:18

## 2024-07-08 VITALS
SYSTOLIC BLOOD PRESSURE: 113 MMHG | TEMPERATURE: 98 F | DIASTOLIC BLOOD PRESSURE: 51 MMHG | RESPIRATION RATE: 17 BRPM | OXYGEN SATURATION: 100 % | HEART RATE: 82 BPM

## 2024-07-08 LAB — T PALLIDUM AB TITR SER: NEGATIVE — SIGNIFICANT CHANGE UP

## 2024-07-08 RX ORDER — ACETAMINOPHEN 325 MG
2 TABLET ORAL
Qty: 0 | Refills: 0 | DISCHARGE
Start: 2024-07-08

## 2024-07-08 RX ORDER — PRENATAL VIT/IRON FUM/FOLIC AC 60 MG-1 MG
1 TABLET ORAL
Qty: 0 | Refills: 0 | DISCHARGE
Start: 2024-07-08

## 2024-07-08 RX ADMIN — Medication 975 MILLIGRAM(S): at 03:41

## 2024-07-08 RX ADMIN — OXYCODONE HYDROCHLORIDE 5 MILLIGRAM(S): 100 SOLUTION ORAL at 12:30

## 2024-07-08 RX ADMIN — OXYCODONE HYDROCHLORIDE 5 MILLIGRAM(S): 100 SOLUTION ORAL at 11:48

## 2024-07-08 RX ADMIN — HEPARIN SODIUM 5000 UNIT(S): 50 INJECTION, SOLUTION INTRAVENOUS at 11:47

## 2024-07-08 RX ADMIN — Medication 600 MILLIGRAM(S): at 06:18

## 2024-07-08 RX ADMIN — Medication 975 MILLIGRAM(S): at 04:21

## 2024-07-08 RX ADMIN — Medication 975 MILLIGRAM(S): at 09:08

## 2024-07-08 RX ADMIN — Medication 600 MILLIGRAM(S): at 00:58

## 2024-07-08 RX ADMIN — Medication 600 MILLIGRAM(S): at 00:03

## 2024-07-08 RX ADMIN — HEPARIN SODIUM 5000 UNIT(S): 50 INJECTION, SOLUTION INTRAVENOUS at 00:03

## 2024-07-08 RX ADMIN — Medication 975 MILLIGRAM(S): at 09:40

## 2024-07-08 RX ADMIN — Medication 600 MILLIGRAM(S): at 06:45

## 2024-07-08 NOTE — PROGRESS NOTE ADULT - SUBJECTIVE AND OBJECTIVE BOX
Patient seen and examined at bedside, resting comfortably in no acute distress. Denies fever, chills, nausea or vomiting. She is tolerating a regular diet. She is ambulating without difficulty and voiding spontaneously. Pain is well controlled. Bleeding is less than a normal menses. Reports passing gas and has had a bowel movement.    Physical Examination:  Vital Signs: Vital Signs Last 24 Hrs  T(C): 36.9 (07 Jul 2024 06:26), Max: 37.2 (06 Jul 2024 14:05)  T(F): 98.4 (07 Jul 2024 06:26), Max: 99 (06 Jul 2024 14:05)  HR: 68 (07 Jul 2024 06:26) (66 - 89)  BP: 112/60 (07 Jul 2024 06:26) (104/54 - 112/60)  BP(mean): --  RR: 18 (07 Jul 2024 06:26) (16 - 18)  SpO2: 97% (07 Jul 2024 06:26) (97% - 100%)    Parameters below as of 07 Jul 2024 06:26  Patient On (Oxygen Delivery Method): room air      General: alert and oriented, no acute distress  Cardio: regular rate and rhythm  Respiratory: non labored respirations  Abdomen: soft, non-tender, non-distended; bowel sounds present; uterus firm, palpated below the umbilicus; incision clean, dry and intact, surrounding skin non erythematous  VE: minimal lochia noted  Musculoskeletal: No erythema/edema, no calf tenderness bilaterally    MEDICATIONS  (STANDING):  acetaminophen     Tablet .. 975 milliGRAM(s) Oral <User Schedule>  diphtheria/tetanus/pertussis (acellular) Vaccine (Adacel) 0.5 milliLiter(s) IntraMuscular once  heparin   Injectable 5000 Unit(s) SubCutaneous every 12 hours  ibuprofen  Tablet. 600 milliGRAM(s) Oral every 6 hours  morphine PF Spinal 0.1 milliGRAM(s) IntraThecal. once      Labs:  Blood type: O Positive  Rubella IgG: RPR:                           11.9   7.67 >-----------< 262    ( 07-06 @ 05:50 )             35.7                        13.5   5.36 >-----------< 284    ( 07-05 @ 07:07 )             38.7    Assessment and Plan:   37yo s/p rLTCS and BS on 7/5 now POD#2.  Patient is stable and doing well post-partum.   Blood type: O+    Plan:  - VS per unit protocol  - SCDs for DVT ppx  - Regular diet  - Pain well controlled, continue current pain regimen  - Encourage ambulation  - Continue wound care  - Discharge instructions reviewed  - D/c planning initiated, patient to follow up in office in 6 weeks for post partum visit    Charles Barron MD
Patient seen and examined at bedside, resting comfortably in no acute distress. Denies fever, chills, nausea or vomiting. She is tolerating a regular diet. She is ambulating without difficulty and voiding spontaneously. Pain is well controlled. Bleeding is less than a normal menses. Reports passing gas but has not yet had a bowel movement.    Physical Examination:  Vital Signs: Vital Signs Last 24 Hrs  T(C): 37.2 (06 Jul 2024 14:05), Max: 37.4 (05 Jul 2024 22:05)  T(F): 99 (06 Jul 2024 14:05), Max: 99.3 (05 Jul 2024 22:05)  HR: 89 (06 Jul 2024 14:05) (50 - 89)  BP: 104/54 (06 Jul 2024 14:05) (92/54 - 115/97)  BP(mean): 60 (05 Jul 2024 18:00) (60 - 100)  RR: 16 (06 Jul 2024 14:05) (12 - 17)  SpO2: 98% (06 Jul 2024 14:05) (96% - 100%)    Parameters below as of 06 Jul 2024 14:05  Patient On (Oxygen Delivery Method): room air      General: alert and oriented, no acute distress  Cardio: regular rate and rhythm  Respiratory: non labored respirations  Abdomen: soft, non-tender, non-distended; bowel sounds present; uterus firm, palpated below the umbilicus; incision clean, dry and intact, surrounding skin non erythematous  VE: minimal lochia noted  Musculoskeletal: No erythema/edema, no calf tenderness bilaterally    MEDICATIONS  (STANDING):  acetaminophen     Tablet .. 975 milliGRAM(s) Oral <User Schedule>  diphtheria/tetanus/pertussis (acellular) Vaccine (Adacel) 0.5 milliLiter(s) IntraMuscular once  heparin   Injectable 5000 Unit(s) SubCutaneous every 12 hours  ibuprofen  Tablet. 600 milliGRAM(s) Oral every 6 hours  morphine PF Spinal 0.1 milliGRAM(s) IntraThecal. once      Labs:  Blood type: O Positive  Rubella IgG: RPR:                           11.9   7.67 >-----------< 262    ( 07-06 @ 05:50 )             35.7                        13.5   5.36 >-----------< 284    ( 07-05 @ 07:07 )             38.7      Assessment and Plan:   37yo s/p rLTCS and BS on 7/5 now POD#1.  Patient is stable and doing well post-partum.   Blood type: O+    Plan:  - VS per unit protocol  - SCDs for DVT ppx  - Regular diet  - Pain well controlled, continue current pain regimen  - Encourage ambulation  - Continue wound care  - Discharge instructions reviewed  - D/c planning initiated, patient to follow up in office in 6 weeks for post partum visit    Charles Barron MD
 S: 39yo POD#3 s/p rLTCS with b/l salpingectomy c/b cholestasis. , H/H 13.5/38.7->11.9/35.7. Pain is well controlled. She is tolerating a regular diet and passing flatus. She is voiding spontaneously, and ambulating without difficulty. Denies CP/SOB. Denies lightheadedness/dizziness. Denies N/V.    O:  Vitals:  Vital Signs Last 24 Hrs  T(C): 36.7 (08 Jul 2024 06:35), Max: 37 (07 Jul 2024 14:00)  T(F): 98 (08 Jul 2024 06:35), Max: 98.6 (07 Jul 2024 14:00)  HR: 81 (08 Jul 2024 06:35) (79 - 87)  BP: 117/59 (08 Jul 2024 06:35) (112/67 - 128/70)  BP(mean): --  RR: 18 (08 Jul 2024 06:35) (18 - 18)  SpO2: 98% (08 Jul 2024 06:35) (97% - 98%)    Parameters above as of 08 Jul 2024 06:35  Patient On (Oxygen Delivery Method): room air        MEDICATIONS  (STANDING):  acetaminophen     Tablet .. 975 milliGRAM(s) Oral <User Schedule>  diphtheria/tetanus/pertussis (acellular) Vaccine (Adacel) 0.5 milliLiter(s) IntraMuscular once  heparin   Injectable 5000 Unit(s) SubCutaneous every 12 hours  ibuprofen  Tablet. 600 milliGRAM(s) Oral every 6 hours    MEDICATIONS  (PRN):  diphenhydrAMINE 25 milliGRAM(s) Oral every 6 hours PRN Pruritus  lanolin Ointment 1 Application(s) Topical every 6 hours PRN Sore Nipples  magnesium hydroxide Suspension 30 milliLiter(s) Oral two times a day PRN Constipation  oxyCODONE    IR 5 milliGRAM(s) Oral once PRN Moderate to Severe Pain (4-10)  oxyCODONE    IR 5 milliGRAM(s) Oral every 3 hours PRN Moderate to Severe Pain (4-10)  simethicone 80 milliGRAM(s) Chew every 4 hours PRN Gas      LABS:  Blood type: O Positive  Rubella IgG: RPR:                           11.9   7.67 >-----------< 262    ( 07-06 @ 05:50 )             35.7        Physical exam:  Gen: NAD  Abdomen: Soft, nontender, no distension , firm uterine fundus 3 below umbilicus.  Incision: Clean, dry, and intact   Pelvic: Normal lochia noted  Ext: No calf tenderness/edema/erythema    A/P: 39yo POD#3 s/p rLTCS with b/l salpingectomy c/b cholestasis. Patient is stable and is doing well post-operatively.    #Post-Partum  - Continue Motrin, Tylenol, Oxycodone PRN for pain control.  - Encouraged use of abdominal binder  - Increase ambulation; SCDs when not ambulating  - Continue regular diet  - Discharge plan-stable for d/c today    CATHY Barrow-BC        
OB Postpartum Note: Repeat  Delivery, POD#1     S: 39yo  POD#1 s/p rLTCS+BS. Her pain is well controlled. She is tolerating a regular diet and passing flatus. Denies N/V. Denies CP/SOB/lightheadedness/dizziness.    Ambulating without difficulty.  Pt voiding spontaneously.  Pt requesting to see lactation.    O:   Vitals:  Vital Signs Last 24 Hrs  T(C): 36.6 (2024 06:22), Max: 37.4 (2024 22:05)  T(F): 97.9 (2024 06:22), Max: 99.3 (2024 22:05)  HR: 62 (2024 06:22) (50 - 71)  BP: 100/55 (2024 06:22) (92/54 - 116/71)  BP(mean): 60 (2024 18:00) (60 - 100)  RR: 17 (2024 06:22) (12 - 17)  SpO2: 96% (2024 06:22) (92% - 100%)    Parameters below as of 2024 22:05  Patient On (Oxygen Delivery Method): room air        MEDICATIONS  (STANDING):  acetaminophen     Tablet .. 975 milliGRAM(s) Oral <User Schedule>  chlorhexidine 2% Cloths 1 Application(s) Topical daily  diphtheria/tetanus/pertussis (acellular) Vaccine (Adacel) 0.5 milliLiter(s) IntraMuscular once  heparin   Injectable 5000 Unit(s) SubCutaneous every 12 hours  ibuprofen  Tablet. 600 milliGRAM(s) Oral every 6 hours  ketorolac   Injectable 30 milliGRAM(s) IV Push every 6 hours  lactated ringers. 1000 milliLiter(s) (75 mL/Hr) IV Continuous <Continuous>  lactated ringers. 1000 milliLiter(s) (125 mL/Hr) IV Continuous <Continuous>  lactated ringers. 1000 milliLiter(s) (75 mL/Hr) IV Continuous <Continuous>  lactated ringers. 1000 milliLiter(s) (200 mL/Hr) IV Continuous <Continuous>  morphine PF Spinal 0.1 milliGRAM(s) IntraThecal. once  oxytocin Infusion 333.333 milliUNIT(s)/Min (1000 mL/Hr) IV Continuous <Continuous>    MEDICATIONS  (PRN):  dexAMETHasone  Injectable 4 milliGRAM(s) IV Push every 6 hours PRN Nausea  diphenhydrAMINE 25 milliGRAM(s) Oral every 6 hours PRN Pruritus  diphenhydrAMINE Injectable 25 milliGRAM(s) IV Push every 4 hours PRN Pruritus  lanolin Ointment 1 Application(s) Topical every 6 hours PRN Sore Nipples  magnesium hydroxide Suspension 30 milliLiter(s) Oral two times a day PRN Constipation  nalbuphine Injectable 2.5 milliGRAM(s) IV Push every 6 hours PRN Pruritus  naloxone Injectable 0.1 milliGRAM(s) IV Push every 3 minutes PRN For ANY of the following changes in patient status:  A. Breaths Per Minute LESS THAN 10, B. Oxygen saturation LESS THAN 90%, C. Sedation score of 6 for Stop After: 4 Times  ondansetron Injectable 4 milliGRAM(s) IV Push every 6 hours PRN Nausea  oxyCODONE    IR 5 milliGRAM(s) Oral every 3 hours PRN Moderate to Severe Pain (4-10)  oxyCODONE    IR 10 milliGRAM(s) Oral every 3 hours PRN Moderate Pain (4 - 6)  oxyCODONE    IR 5 milliGRAM(s) Oral every 3 hours PRN Mild Pain (1 - 3)  oxyCODONE    IR 5 milliGRAM(s) Oral once PRN Moderate to Severe Pain (4-10)  simethicone 80 milliGRAM(s) Chew every 4 hours PRN Gas      Labs:  Blood type: O Positive  Rubella IgG: RPR:                           13.5   5.36 >-----------< 284    ( -05 @ 07:07 )             38.7                  PE:  General: NAD  Abdomen: mod distended, appropriately tender, fundus firm, incision clean/dry/intact with Dermabond  Extremities: SCDs in place    A/P: 39yo GP POD#1 s/p rLTCS+BS.  Patient is stable and doing well post-operatively.    - Continue regular diet  - Increase ambulation.  - Continue motrin, tylenol, oxycodone PRN for pain control  - F/u AM CBC    Jadon Shultz PGY1  
POST ANESTHESIA EVALUATION      INTERVAL HPI/OVERNIGHT EVENTS:  38y Female s/p c section under spinal anesthesia with duramorph for post op analgesia on 7/5/2024    Vital Signs Last 24 Hrs  T(C): 36.6 (06 Jul 2024 06:22), Max: 37.4 (05 Jul 2024 22:05)  T(F): 97.9 (06 Jul 2024 06:22), Max: 99.3 (05 Jul 2024 22:05)  HR: 62 (06 Jul 2024 06:22) (50 - 71)  BP: 100/55 (06 Jul 2024 06:22) (92/54 - 116/71)  BP(mean): 60 (05 Jul 2024 18:00) (60 - 100)  RR: 17 (06 Jul 2024 06:22) (12 - 17)  SpO2: 96% (06 Jul 2024 06:22) (92% - 100%)    Parameters below as of 05 Jul 2024 22:05  Patient On (Oxygen Delivery Method): room air            Patient's overall anesthesia satisfaction: satisfied     Patients pain is well controlled    No respiratory events overnight    No pruritis at this time    Patient seen and doing well     No headache      No residual numbness or weakness, sensory and motor function intact    No anesthetic complications or complaints noted or reported          .            
S: 37yo POD#2 s/p rLTCS with b/l salpingectomy c/b cholestasis. , H/H 13.5/38.7->11.9/35.7. Pain is well controlled. She is tolerating a regular diet and passing flatus. She is voiding spontaneously, and ambulating without difficulty. Denies CP/SOB. Denies lightheadedness/dizziness. Denies N/V.    O:  Vitals:  Vital Signs Last 24 Hrs  T(C): 36.9 (07 Jul 2024 06:26), Max: 37.2 (06 Jul 2024 14:05)  T(F): 98.4 (07 Jul 2024 06:26), Max: 99 (06 Jul 2024 14:05)  HR: 68 (07 Jul 2024 06:26) (66 - 89)  BP: 112/60 (07 Jul 2024 06:26) (104/54 - 112/60)  BP(mean): --  RR: 18 (07 Jul 2024 06:26) (16 - 18)  SpO2: 97% (07 Jul 2024 06:26) (97% - 100%)    Parameters above as of 07 Jul 2024 06:26  Patient On (Oxygen Delivery Method): room air        MEDICATIONS  (STANDING):  acetaminophen     Tablet .. 975 milliGRAM(s) Oral <User Schedule>  diphtheria/tetanus/pertussis (acellular) Vaccine (Adacel) 0.5 milliLiter(s) IntraMuscular once  heparin   Injectable 5000 Unit(s) SubCutaneous every 12 hours  ibuprofen  Tablet. 600 milliGRAM(s) Oral every 6 hours  morphine PF Spinal 0.1 milliGRAM(s) IntraThecal. once      MEDICATIONS  (PRN):  dexAMETHasone  Injectable 4 milliGRAM(s) IV Push every 6 hours PRN Nausea  diphenhydrAMINE 25 milliGRAM(s) Oral every 6 hours PRN Pruritus  diphenhydrAMINE Injectable 25 milliGRAM(s) IV Push every 4 hours PRN Pruritus  lanolin Ointment 1 Application(s) Topical every 6 hours PRN Sore Nipples  magnesium hydroxide Suspension 30 milliLiter(s) Oral two times a day PRN Constipation  nalbuphine Injectable 2.5 milliGRAM(s) IV Push every 6 hours PRN Pruritus  naloxone Injectable 0.1 milliGRAM(s) IV Push every 3 minutes PRN For ANY of the following changes in patient status:  A. Breaths Per Minute LESS THAN 10, B. Oxygen saturation LESS THAN 90%, C. Sedation score of 6 for Stop After: 4 Times  ondansetron Injectable 4 milliGRAM(s) IV Push every 6 hours PRN Nausea  oxyCODONE    IR 5 milliGRAM(s) Oral every 3 hours PRN Moderate to Severe Pain (4-10)  oxyCODONE    IR 5 milliGRAM(s) Oral once PRN Moderate to Severe Pain (4-10)  simethicone 80 milliGRAM(s) Chew every 4 hours PRN Gas      Labs:  Blood type: O Positive  Rubella IgG: RPR:                           11.9   7.67 >-----------< 262    ( 07-06 @ 05:50 )             35.7                        13.5   5.36 >-----------< 284    ( 07-05 @ 07:07 )             38.7        PE:  General: NAD  Abdomen: Soft, appropriately tender, incision c/d/i.  Lochia: WNL  Extremities: No calf tenderness/erythema/edema    A/P: 37yo POD#2 s/p rLTCS with b/l salpingectomy c/b cholestasis.  Patient is stable and doing well post-operatively.      #Post-Partum  - Continue regular diet.  - Encouraged use of abdominal binder.  - Increase ambulation; SCDs when not ambulating.   - Continue Motrin, Tylenol,Oxycodone PRN for pain control.    -D/C plan-stable but desires discharge tomorrow    CATHY Barrow-BC

## 2024-07-11 RX ORDER — ASPIRIN 325 MG/1
2 TABLET, FILM COATED ORAL
Refills: 0 | DISCHARGE

## 2024-07-11 RX ORDER — ASPIRIN 325 MG/1
1 TABLET, FILM COATED ORAL
Refills: 0 | DISCHARGE

## 2024-07-11 RX ORDER — PRENATAL VIT/IRON FUM/FOLIC AC 60 MG-1 MG
1 TABLET ORAL
Refills: 0 | DISCHARGE

## 2024-07-11 RX ORDER — URSODIOL 300 MG/1
1 CAPSULE ORAL
Refills: 0 | DISCHARGE

## 2024-07-15 LAB — SURGICAL PATHOLOGY STUDY: SIGNIFICANT CHANGE UP

## 2024-07-17 ENCOUNTER — APPOINTMENT (OUTPATIENT)
Dept: ANTEPARTUM | Facility: CLINIC | Age: 39
End: 2024-07-17

## 2024-07-17 ENCOUNTER — OUTPATIENT (OUTPATIENT)
Dept: INPATIENT UNIT | Facility: HOSPITAL | Age: 39
LOS: 1 days | Discharge: HOME | End: 2024-07-17
Payer: MEDICAID

## 2024-07-17 DIAGNOSIS — Z98.82 BREAST IMPLANT STATUS: Chronic | ICD-10-CM

## 2024-07-17 DIAGNOSIS — Z98.890 OTHER SPECIFIED POSTPROCEDURAL STATES: Chronic | ICD-10-CM

## 2024-07-17 DIAGNOSIS — Z98.891 HISTORY OF UTERINE SCAR FROM PREVIOUS SURGERY: Chronic | ICD-10-CM

## 2024-07-17 DIAGNOSIS — O26.899 OTHER SPECIFIED PREGNANCY RELATED CONDITIONS, UNSPECIFIED TRIMESTER: ICD-10-CM

## 2024-07-17 PROBLEM — O26.643: Chronic | Status: ACTIVE | Noted: 2024-06-24

## 2024-07-17 LAB
ALBUMIN SERPL ELPH-MCNC: 3.5 G/DL — SIGNIFICANT CHANGE UP (ref 3.3–5)
ALP SERPL-CCNC: 114 U/L — SIGNIFICANT CHANGE UP (ref 40–120)
ALT FLD-CCNC: 36 U/L — HIGH (ref 4–33)
ANION GAP SERPL CALC-SCNC: 13 MMOL/L — SIGNIFICANT CHANGE UP (ref 7–14)
AST SERPL-CCNC: 22 U/L — SIGNIFICANT CHANGE UP (ref 4–32)
BASOPHILS # BLD AUTO: 0.04 K/UL — SIGNIFICANT CHANGE UP (ref 0–0.2)
BASOPHILS NFR BLD AUTO: 0.6 % — SIGNIFICANT CHANGE UP (ref 0–2)
BILIRUB SERPL-MCNC: <0.2 MG/DL — SIGNIFICANT CHANGE UP (ref 0.2–1.2)
BUN SERPL-MCNC: 14 MG/DL — SIGNIFICANT CHANGE UP (ref 7–23)
CALCIUM SERPL-MCNC: 8.9 MG/DL — SIGNIFICANT CHANGE UP (ref 8.4–10.5)
CHLORIDE SERPL-SCNC: 105 MMOL/L — SIGNIFICANT CHANGE UP (ref 98–107)
CO2 SERPL-SCNC: 22 MMOL/L — SIGNIFICANT CHANGE UP (ref 22–31)
CREAT SERPL-MCNC: 0.43 MG/DL — LOW (ref 0.5–1.3)
EGFR: 127 ML/MIN/1.73M2 — SIGNIFICANT CHANGE UP
EOSINOPHIL # BLD AUTO: 0.37 K/UL — SIGNIFICANT CHANGE UP (ref 0–0.5)
EOSINOPHIL NFR BLD AUTO: 5.3 % — SIGNIFICANT CHANGE UP (ref 0–6)
GLUCOSE SERPL-MCNC: 81 MG/DL — SIGNIFICANT CHANGE UP (ref 70–99)
HCT VFR BLD CALC: 38.7 % — SIGNIFICANT CHANGE UP (ref 34.5–45)
HGB BLD-MCNC: 13 G/DL — SIGNIFICANT CHANGE UP (ref 11.5–15.5)
IANC: 4.13 K/UL — SIGNIFICANT CHANGE UP (ref 1.8–7.4)
IMM GRANULOCYTES NFR BLD AUTO: 0.6 % — SIGNIFICANT CHANGE UP (ref 0–0.9)
LDH SERPL L TO P-CCNC: 179 U/L — SIGNIFICANT CHANGE UP (ref 135–225)
LYMPHOCYTES # BLD AUTO: 1.88 K/UL — SIGNIFICANT CHANGE UP (ref 1–3.3)
LYMPHOCYTES # BLD AUTO: 27.1 % — SIGNIFICANT CHANGE UP (ref 13–44)
MCHC RBC-ENTMCNC: 29.5 PG — SIGNIFICANT CHANGE UP (ref 27–34)
MCHC RBC-ENTMCNC: 33.6 GM/DL — SIGNIFICANT CHANGE UP (ref 32–36)
MCV RBC AUTO: 87.8 FL — SIGNIFICANT CHANGE UP (ref 80–100)
MONOCYTES # BLD AUTO: 0.48 K/UL — SIGNIFICANT CHANGE UP (ref 0–0.9)
MONOCYTES NFR BLD AUTO: 6.9 % — SIGNIFICANT CHANGE UP (ref 2–14)
NEUTROPHILS # BLD AUTO: 4.13 K/UL — SIGNIFICANT CHANGE UP (ref 1.8–7.4)
NEUTROPHILS NFR BLD AUTO: 59.5 % — SIGNIFICANT CHANGE UP (ref 43–77)
NRBC # BLD: 0 /100 WBCS — SIGNIFICANT CHANGE UP (ref 0–0)
NRBC # FLD: 0 K/UL — SIGNIFICANT CHANGE UP (ref 0–0)
PLATELET # BLD AUTO: 387 K/UL — SIGNIFICANT CHANGE UP (ref 150–400)
POTASSIUM SERPL-MCNC: 3.9 MMOL/L — SIGNIFICANT CHANGE UP (ref 3.5–5.3)
POTASSIUM SERPL-SCNC: 3.9 MMOL/L — SIGNIFICANT CHANGE UP (ref 3.5–5.3)
PROT SERPL-MCNC: 6.4 G/DL — SIGNIFICANT CHANGE UP (ref 6–8.3)
RBC # BLD: 4.41 M/UL — SIGNIFICANT CHANGE UP (ref 3.8–5.2)
RBC # FLD: 12.9 % — SIGNIFICANT CHANGE UP (ref 10.3–14.5)
SODIUM SERPL-SCNC: 140 MMOL/L — SIGNIFICANT CHANGE UP (ref 135–145)
URATE SERPL-MCNC: 4.4 MG/DL — SIGNIFICANT CHANGE UP (ref 2.5–7)
WBC # BLD: 6.94 K/UL — SIGNIFICANT CHANGE UP (ref 3.8–10.5)
WBC # FLD AUTO: 6.94 K/UL — SIGNIFICANT CHANGE UP (ref 3.8–10.5)

## 2024-07-17 PROCEDURE — 99213 OFFICE O/P EST LOW 20 MIN: CPT

## 2024-07-17 RX ORDER — DIPHENHYDRAMINE HCL 12.5MG/5ML
25 ELIXIR ORAL ONCE
Refills: 0 | Status: COMPLETED | OUTPATIENT
Start: 2024-07-17 | End: 2024-07-17

## 2024-07-17 RX ORDER — METOCLOPRAMIDE 5 MG/5ML
10 SOLUTION ORAL ONCE
Refills: 0 | Status: COMPLETED | OUTPATIENT
Start: 2024-07-17 | End: 2024-07-17

## 2024-07-17 RX ORDER — DIPHENHYDRAMINE HCL 12.5MG/5ML
1 ELIXIR ORAL
Qty: 20 | Refills: 0
Start: 2024-07-17 | End: 2024-07-21

## 2024-07-17 RX ADMIN — Medication 25 MILLIGRAM(S): at 14:17

## 2024-07-17 RX ADMIN — METOCLOPRAMIDE 10 MILLIGRAM(S): 5 SOLUTION ORAL at 14:22

## 2024-07-17 NOTE — OB POSTPARTUM TRIAGE NOTE - HISTORY OF PRESENT ILLNESS
37yo  s/p repeat  on  presents with HA. Patient reports HA is ongoing for past 5 days. It was originally relieved by Tylenol but its giving no relief since yesterday. Last dose 1000mg at 0930 this am.   Denies N/V, visual changes, epigastric pain. Denies photophobia/phonophobia. Denies H/O Migraines. Pain does not resolve when laying flat.      H/O Hernia repair x 2  Breast Implants  Gastric Bypass   Abdominalplasty    2011 FT   2013 FT   2024 FT  all uncomplicated

## 2024-07-17 NOTE — OB POSTPARTUM TRIAGE NOTE - NSOBPROVIDERNOTE_OBGYN_ALL_OB_FT
Education provided on the pain management plan of care/Safe use, storage and disposal of opioids when prescribed/Side effects of pain management treatment/Activities of daily living, including home environment that might     exacerbate pain or reduce effectiveness of the pain management plan of care as well as strategies to address these issues Plan D/W Dr. Scott, no evidence of PEC at this time.   Not intractable HA post partum   Follow up as scheduled  Call MD if symptoms return or worsen.

## 2024-07-17 NOTE — OB POSTPARTUM TRIAGE NOTE - NSHPLABSRESULTS_GEN_ALL_CORE
07-17    140  |  105  |  14  ----------------------------<  81  3.9   |  22  |  0.43<L>    Ca    8.9      17 Jul 2024 14:09    TPro  6.4  /  Alb  3.5  /  TBili  <0.2  /  DBili  x   /  AST  22  /  ALT  36<H>  /  AlkPhos  114  07-17                          13.0   6.94  )-----------( 387      ( 17 Jul 2024 14:09 )             38.7

## 2024-07-17 NOTE — OB POSTPARTUM TRIAGE NOTE - NSHPPHYSICALEXAM_GEN_ALL_CORE
Assessment reveals VSS, abdomen soft, NT.   Steri-strips to incision CDI  HELLP labs  Benadryl 25mg IVP/Reglan 10mg IVP Assessment reveals VSS, abdomen soft, NT.   BP range 93-/68 pulse 69  Steri-strips to incision CDI  HELLP labs  Benadryl 25mg IVP/Reglan 10mg IVP    HELLP labs WNL  HA has completely resolved

## 2024-07-19 DIAGNOSIS — G44.209 TENSION-TYPE HEADACHE, UNSPECIFIED, NOT INTRACTABLE: ICD-10-CM

## 2024-07-24 ENCOUNTER — APPOINTMENT (OUTPATIENT)
Dept: OBGYN | Facility: CLINIC | Age: 39
End: 2024-07-24
Payer: MEDICAID

## 2024-07-24 VITALS
SYSTOLIC BLOOD PRESSURE: 109 MMHG | WEIGHT: 197 LBS | HEIGHT: 64 IN | DIASTOLIC BLOOD PRESSURE: 74 MMHG | BODY MASS INDEX: 33.63 KG/M2 | HEART RATE: 65 BPM

## 2024-07-24 DIAGNOSIS — R51.9 HEADACHE, UNSPECIFIED: ICD-10-CM

## 2024-07-24 PROCEDURE — 99214 OFFICE O/P EST MOD 30 MIN: CPT | Mod: 24

## 2024-07-24 NOTE — PLAN
[FreeTextEntry1] : 39 y/o s/p rCS for incision check with headaches, possible PPD  Plan: - Recommend neurology f/u for persistent headaches - reassurance and support provided to patient; offered social work consult which she now accepts - incision clean, dry and intact - f/u for postpartum visit

## 2024-07-24 NOTE — HISTORY OF PRESENT ILLNESS
[FreeTextEntry1] : 39 y/o s/p rCS presents for incision check, f/u for headaches. Reports persistent headaches since delivery not very responsive to tylenol. Was seen in triage and PEC ruled out. BPs normal today in office. Reports feeling tired, becomes very emotional dealing with stresses at home. Having disagreement with her  as his daughter is with them and she is not behaving, and his mother is also there whom patient does not get along with. Denies suicidal ideation, just feels sad about the situation, feels as though she is a bad person for having her feelings. Previously offered social work consult which patient declined, is now more open to it.

## 2024-07-24 NOTE — PHYSICAL EXAM
[Appropriately responsive] : appropriately responsive [Alert] : alert [No Acute Distress] : no acute distress [Regular Rate Rhythm] : regular rate rhythm [Oriented x3] : oriented x3 [FreeTextEntry5] : non labored breathing [FreeTextEntry7] : incision clean, dry and intact

## 2024-07-24 NOTE — PLAN
[FreeTextEntry1] : 37 y/o s/p rCS for incision check with headaches, possible PPD  Plan: - Recommend neurology f/u for persistent headaches - reassurance and support provided to patient; offered social work consult which she now accepts - incision clean, dry and intact - f/u for postpartum visit

## 2024-07-24 NOTE — HISTORY OF PRESENT ILLNESS
[FreeTextEntry1] : 37 y/o s/p rCS presents for incision check, f/u for headaches. Reports persistent headaches since delivery not very responsive to tylenol. Was seen in triage and PEC ruled out. BPs normal today in office. Reports feeling tired, becomes very emotional dealing with stresses at home. Having disagreement with her  as his daughter is with them and she is not behaving, and his mother is also there whom patient does not get along with. Denies suicidal ideation, just feels sad about the situation, feels as though she is a bad person for having her feelings. Previously offered social work consult which patient declined, is now more open to it.

## 2024-08-12 ENCOUNTER — APPOINTMENT (OUTPATIENT)
Dept: OBGYN | Facility: CLINIC | Age: 39
End: 2024-08-12
Payer: MEDICAID

## 2024-08-12 VITALS
HEIGHT: 64 IN | WEIGHT: 185 LBS | HEART RATE: 71 BPM | SYSTOLIC BLOOD PRESSURE: 107 MMHG | BODY MASS INDEX: 31.58 KG/M2 | DIASTOLIC BLOOD PRESSURE: 73 MMHG

## 2024-08-12 PROCEDURE — 0503F POSTPARTUM CARE VISIT: CPT

## 2024-08-12 NOTE — HISTORY OF PRESENT ILLNESS
[Postpartum Follow Up] : postpartum follow up [ Section] :  section [Delivery Date Was ___] : delivery date was [unfilled] [Boy] : baby is a boy [Infant's Name ___] : [unfilled] [___ Lbs] : [unfilled] lbs [___ Oz] : [unfilled] oz [Circumcised] : circumcised [Living at Home] : is currently living at home [Bottle Feeding] : bottle feeding [Complications:___] : no complications [Breastfeeding] : currently nursing [Resumed Menses] : has not resumed her menses [Resumed [place]] : has not resumed intercourse [Intended Contraception] : the patient does not intended to use contraception postpartum [Clean/Dry/Intact] : clean, dry and intact [Erythema] : not erythematous [Swelling] : not swollen [Dehiscence] : not dehisced [Healed] : healed [Back to Normal] : is back to normal in size [None] : no vaginal bleeding [Normal] : the vagina was normal [Cervix Sample Taken] : cervical sample not taken for a Pap smear [Examination Of The Breasts] : breasts are normal [de-identified] : EPDS score 11; reports feeling better, was contacted by SW, but she was not able to call back yet. Bonding well with baby [de-identified] : P3 s/p rCS for PP visit [de-identified] : EPDS score 11 reviewed with patient; encouraged SW f/u, will reach out to SW again; had tubal ligation with ; can resume normal activity; RTO for annual or PRN

## 2024-08-12 NOTE — HISTORY OF PRESENT ILLNESS
[Postpartum Follow Up] : postpartum follow up [ Section] :  section [Delivery Date Was ___] : delivery date was [unfilled] [Boy] : baby is a boy [Infant's Name ___] : [unfilled] [___ Lbs] : [unfilled] lbs [___ Oz] : [unfilled] oz [Circumcised] : circumcised [Living at Home] : is currently living at home [Bottle Feeding] : bottle feeding [Complications:___] : no complications [Breastfeeding] : currently nursing [Resumed Menses] : has not resumed her menses [Resumed [place]] : has not resumed intercourse [Intended Contraception] : the patient does not intended to use contraception postpartum [Clean/Dry/Intact] : clean, dry and intact [Erythema] : not erythematous [Swelling] : not swollen [Dehiscence] : not dehisced [Healed] : healed [Back to Normal] : is back to normal in size [None] : no vaginal bleeding [Normal] : the vagina was normal [Cervix Sample Taken] : cervical sample not taken for a Pap smear [Examination Of The Breasts] : breasts are normal [de-identified] : EPDS score 11; reports feeling better, was contacted by SW, but she was not able to call back yet. Bonding well with baby [de-identified] : P3 s/p rCS for PP visit [de-identified] : EPDS score 11 reviewed with patient; encouraged SW f/u, will reach out to SW again; had tubal ligation with ; can resume normal activity; RTO for annual or PRN

## 2024-08-14 ENCOUNTER — TRANSCRIPTION ENCOUNTER (OUTPATIENT)
Age: 39
End: 2024-08-14

## 2024-08-16 ENCOUNTER — APPOINTMENT (OUTPATIENT)
Dept: MRI IMAGING | Facility: HOSPITAL | Age: 39
End: 2024-08-16

## 2024-09-17 ENCOUNTER — RX RENEWAL (OUTPATIENT)
Age: 39
End: 2024-09-17

## 2024-09-17 RX ORDER — .BETA.-CAROTENE, ASCORBIC ACID, CHOLECALCIFEROL, .ALPHA.-TOCOPHEROL ACETATE, DL-, THIAMINE, RIBOFLAVIN, NIACINAMIDE, PYRIDOXINE HYDROCHLORIDE, FOLIC ACID, CYANOCOBALAMIN, CALCIUM PANTOTHENATE, CALCIUM CARBONATE, FERROUS FUMARATE, ZINC OXIDE AND DOCUSATE SODIUM 1000; 100; 400; 30; 3; 3; 15; 20; 1; 12; 7; 200; 29; 20; 25 [IU]/1; MG/1; [IU]/1; MG/1; MG/1; MG/1; MG/1; MG/1; MG/1; UG/1; MG/1; MG/1; MG/1; MG/1; MG/1
29-1 TABLET ORAL
Qty: 30 | Refills: 11 | Status: ACTIVE | COMMUNITY
Start: 2024-09-17 | End: 1900-01-01

## 2024-12-02 ENCOUNTER — APPOINTMENT (OUTPATIENT)
Dept: OBGYN | Facility: CLINIC | Age: 39
End: 2024-12-02
Payer: MEDICAID

## 2024-12-02 VITALS
HEIGHT: 64 IN | SYSTOLIC BLOOD PRESSURE: 111 MMHG | BODY MASS INDEX: 35.51 KG/M2 | DIASTOLIC BLOOD PRESSURE: 58 MMHG | WEIGHT: 208 LBS | HEART RATE: 73 BPM

## 2024-12-02 DIAGNOSIS — Z01.419 ENCOUNTER FOR GYNECOLOGICAL EXAMINATION (GENERAL) (ROUTINE) W/OUT ABNORMAL FINDINGS: ICD-10-CM

## 2024-12-02 PROCEDURE — 99396 PREV VISIT EST AGE 40-64: CPT

## 2024-12-02 PROCEDURE — 99459 PELVIC EXAMINATION: CPT

## 2024-12-04 LAB
CYTOLOGY CVX/VAG DOC THIN PREP: NORMAL
HPV HIGH+LOW RISK DNA PNL CVX: NOT DETECTED

## (undated) DEVICE — DRSG DERMABOND PRINEO 22CM